# Patient Record
Sex: FEMALE | Race: WHITE | NOT HISPANIC OR LATINO | Employment: FULL TIME | URBAN - METROPOLITAN AREA
[De-identification: names, ages, dates, MRNs, and addresses within clinical notes are randomized per-mention and may not be internally consistent; named-entity substitution may affect disease eponyms.]

---

## 2019-02-21 ENCOUNTER — APPOINTMENT (OUTPATIENT)
Dept: WOMENS IMAGING | Facility: HOSPITAL | Age: 39
End: 2019-02-21

## 2019-02-21 PROCEDURE — 77063 BREAST TOMOSYNTHESIS BI: CPT | Performed by: RADIOLOGY

## 2019-02-21 PROCEDURE — 77067 SCR MAMMO BI INCL CAD: CPT | Performed by: RADIOLOGY

## 2019-05-21 ENCOUNTER — APPOINTMENT (OUTPATIENT)
Dept: WOMENS IMAGING | Facility: HOSPITAL | Age: 39
End: 2019-05-21

## 2019-05-21 PROCEDURE — 76641 ULTRASOUND BREAST COMPLETE: CPT | Performed by: RADIOLOGY

## 2020-02-25 ENCOUNTER — OFFICE VISIT (OUTPATIENT)
Dept: FAMILY MEDICINE CLINIC | Facility: CLINIC | Age: 40
End: 2020-02-25

## 2020-02-25 ENCOUNTER — APPOINTMENT (OUTPATIENT)
Dept: WOMENS IMAGING | Facility: HOSPITAL | Age: 40
End: 2020-02-25

## 2020-02-25 VITALS
BODY MASS INDEX: 30.73 KG/M2 | WEIGHT: 180 LBS | HEART RATE: 96 BPM | SYSTOLIC BLOOD PRESSURE: 118 MMHG | OXYGEN SATURATION: 95 % | TEMPERATURE: 98.4 F | HEIGHT: 64 IN | DIASTOLIC BLOOD PRESSURE: 80 MMHG

## 2020-02-25 DIAGNOSIS — Z23 NEED FOR TDAP VACCINATION: ICD-10-CM

## 2020-02-25 DIAGNOSIS — Z23 NEED FOR INFLUENZA VACCINATION: ICD-10-CM

## 2020-02-25 DIAGNOSIS — E03.9 HYPOTHYROIDISM, UNSPECIFIED TYPE: ICD-10-CM

## 2020-02-25 DIAGNOSIS — Z00.00 ROUTINE HEALTH MAINTENANCE: Primary | ICD-10-CM

## 2020-02-25 PROCEDURE — 77063 BREAST TOMOSYNTHESIS BI: CPT | Performed by: RADIOLOGY

## 2020-02-25 PROCEDURE — 90686 IIV4 VACC NO PRSV 0.5 ML IM: CPT | Performed by: FAMILY MEDICINE

## 2020-02-25 PROCEDURE — 99386 PREV VISIT NEW AGE 40-64: CPT | Performed by: FAMILY MEDICINE

## 2020-02-25 PROCEDURE — 90715 TDAP VACCINE 7 YRS/> IM: CPT | Performed by: FAMILY MEDICINE

## 2020-02-25 PROCEDURE — 77067 SCR MAMMO BI INCL CAD: CPT | Performed by: RADIOLOGY

## 2020-02-25 PROCEDURE — 90472 IMMUNIZATION ADMIN EACH ADD: CPT | Performed by: FAMILY MEDICINE

## 2020-02-25 PROCEDURE — 90471 IMMUNIZATION ADMIN: CPT | Performed by: FAMILY MEDICINE

## 2020-02-25 RX ORDER — OMEPRAZOLE 40 MG/1
40 CAPSULE, DELAYED RELEASE ORAL DAILY
COMMUNITY

## 2020-02-25 RX ORDER — NORETHINDRONE ACETATE AND ETHINYL ESTRADIOL 1; .02 MG/1; MG/1
1 TABLET ORAL DAILY
COMMUNITY
End: 2021-02-24

## 2020-02-25 RX ORDER — LEVOTHYROXINE SODIUM 0.12 MG/1
125 TABLET ORAL DAILY
COMMUNITY
End: 2020-02-26 | Stop reason: DRUGHIGH

## 2020-02-25 RX ORDER — FAMCICLOVIR 250 MG/1
TABLET ORAL
COMMUNITY
Start: 2017-01-31
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
## 2020-02-26 LAB
ALBUMIN SERPL-MCNC: 3.9 G/DL (ref 3.5–5.2)
ALBUMIN/GLOB SERPL: 1.2 G/DL
ALP SERPL-CCNC: 72 U/L (ref 39–117)
ALT SERPL-CCNC: 15 U/L (ref 1–33)
AST SERPL-CCNC: 15 U/L (ref 1–32)
BASOPHILS # BLD AUTO: 0.02 10*3/MM3 (ref 0–0.2)
BASOPHILS NFR BLD AUTO: 0.6 % (ref 0–1.5)
BILIRUB SERPL-MCNC: 0.4 MG/DL (ref 0.2–1.2)
BUN SERPL-MCNC: 15 MG/DL (ref 6–20)
BUN/CREAT SERPL: 20.3 (ref 7–25)
CALCIUM SERPL-MCNC: 9 MG/DL (ref 8.6–10.5)
CHLORIDE SERPL-SCNC: 100 MMOL/L (ref 98–107)
CHOLEST SERPL-MCNC: 148 MG/DL (ref 0–200)
CO2 SERPL-SCNC: 23.1 MMOL/L (ref 22–29)
CREAT SERPL-MCNC: 0.74 MG/DL (ref 0.57–1)
EOSINOPHIL # BLD AUTO: 0.11 10*3/MM3 (ref 0–0.4)
EOSINOPHIL NFR BLD AUTO: 3.1 % (ref 0.3–6.2)
ERYTHROCYTE [DISTWIDTH] IN BLOOD BY AUTOMATED COUNT: 12.6 % (ref 12.3–15.4)
GLOBULIN SER CALC-MCNC: 3.3 GM/DL
GLUCOSE SERPL-MCNC: 80 MG/DL (ref 65–99)
HCT VFR BLD AUTO: 40.1 % (ref 34–46.6)
HDLC SERPL-MCNC: 45 MG/DL (ref 40–60)
HGB BLD-MCNC: 14 G/DL (ref 12–15.9)
IMM GRANULOCYTES # BLD AUTO: 0.01 10*3/MM3 (ref 0–0.05)
IMM GRANULOCYTES NFR BLD AUTO: 0.3 % (ref 0–0.5)
LDLC SERPL CALC-MCNC: 76 MG/DL (ref 0–100)
LYMPHOCYTES # BLD AUTO: 1.07 10*3/MM3 (ref 0.7–3.1)
LYMPHOCYTES NFR BLD AUTO: 30.5 % (ref 19.6–45.3)
MCH RBC QN AUTO: 33.2 PG (ref 26.6–33)
MCHC RBC AUTO-ENTMCNC: 34.9 G/DL (ref 31.5–35.7)
MCV RBC AUTO: 95 FL (ref 79–97)
MONOCYTES # BLD AUTO: 0.3 10*3/MM3 (ref 0.1–0.9)
MONOCYTES NFR BLD AUTO: 8.5 % (ref 5–12)
NEUTROPHILS # BLD AUTO: 2 10*3/MM3 (ref 1.7–7)
NEUTROPHILS NFR BLD AUTO: 57 % (ref 42.7–76)
NRBC BLD AUTO-RTO: 0 /100 WBC (ref 0–0.2)
PLATELET # BLD AUTO: 276 10*3/MM3 (ref 140–450)
POTASSIUM SERPL-SCNC: 4.5 MMOL/L (ref 3.5–5.2)
PROT SERPL-MCNC: 7.2 G/DL (ref 6–8.5)
RBC # BLD AUTO: 4.22 10*6/MM3 (ref 3.77–5.28)
SODIUM SERPL-SCNC: 137 MMOL/L (ref 136–145)
T3FREE SERPL-MCNC: 5.5 PG/ML (ref 2–4.4)
T4 FREE SERPL-MCNC: 2.24 NG/DL (ref 0.93–1.7)
THYROPEROXIDASE AB SERPL-ACNC: 79 IU/ML (ref 0–34)
TRIGL SERPL-MCNC: 137 MG/DL (ref 0–150)
TSH SERPL DL<=0.005 MIU/L-ACNC: <0.005 UIU/ML (ref 0.27–4.2)
VLDLC SERPL CALC-MCNC: 27.4 MG/DL
WBC # BLD AUTO: 3.51 10*3/MM3 (ref 3.4–10.8)

## 2020-02-26 RX ORDER — LEVOTHYROXINE SODIUM 0.1 MG/1
100 TABLET ORAL DAILY
Qty: 90 TABLET | Refills: 1 | Status: SHIPPED | OUTPATIENT
Start: 2020-02-26 | End: 2020-08-24

## 2020-03-05 ENCOUNTER — HOSPITAL ENCOUNTER (OUTPATIENT)
Dept: ULTRASOUND IMAGING | Facility: HOSPITAL | Age: 40
Discharge: HOME | End: 2020-03-05
Admitting: FAMILY MEDICINE

## 2020-03-05 DIAGNOSIS — E03.9 HYPOTHYROIDISM, UNSPECIFIED TYPE: ICD-10-CM

## 2020-03-05 PROCEDURE — 76536 US EXAM OF HEAD AND NECK: CPT

## 2020-08-24 RX ORDER — LEVOTHYROXINE SODIUM 0.1 MG/1
100 TABLET ORAL DAILY
Qty: 90 TABLET | Refills: 0 | Status: SHIPPED | OUTPATIENT
Start: 2020-08-24 | End: 2020-08-26

## 2020-08-25 ENCOUNTER — OFFICE VISIT (OUTPATIENT)
Dept: FAMILY MEDICINE CLINIC | Facility: CLINIC | Age: 40
End: 2020-08-25

## 2020-08-25 VITALS
SYSTOLIC BLOOD PRESSURE: 112 MMHG | TEMPERATURE: 97.3 F | DIASTOLIC BLOOD PRESSURE: 66 MMHG | HEART RATE: 100 BPM | HEIGHT: 64 IN | BODY MASS INDEX: 30.11 KG/M2 | OXYGEN SATURATION: 98 % | WEIGHT: 176.4 LBS

## 2020-08-25 DIAGNOSIS — E03.8 HYPOTHYROIDISM DUE TO HASHIMOTO'S THYROIDITIS: Primary | ICD-10-CM

## 2020-08-25 DIAGNOSIS — D49.2 NEOPLASM OF SKIN OF CHEST: ICD-10-CM

## 2020-08-25 DIAGNOSIS — E06.3 HYPOTHYROIDISM DUE TO HASHIMOTO'S THYROIDITIS: Primary | ICD-10-CM

## 2020-08-25 DIAGNOSIS — E04.1 THYROID NODULE: ICD-10-CM

## 2020-08-25 PROCEDURE — 99213 OFFICE O/P EST LOW 20 MIN: CPT | Performed by: FAMILY MEDICINE

## 2020-08-25 NOTE — PROGRESS NOTES
Subjective   Saskia Fox is a 40 y.o. female.     Chief Complaint   Patient presents with   • Hypothyroidism        Patient presents for her 6-month follow-up thyroid.  After I last saw her we adjusted her medication for low TSH and found that she was in fact Hashimoto's.  She had a thyroid ultrasound that confirmed 2 small thyroid nodules and will be due for repeat in 1 year.  She has questions about weight and we discussed diet at length.  She is also wondering about a spot that came up on her right chest over the past few weeks.         The following portions of the patient's history were reviewed and updated as appropriate: allergies, current medications, past family history, past medical history, past social history, past surgical history and problem list.    Past Medical History:   Diagnosis Date   • Abnormal Papanicolaou smear of cervix 2003    CIN1   • Cervicitis 2002   • Cyst of ovary    • Dysmenorrhea    • Endometriosis 2000   • Headache    • HPV in female 2009   • Hypothyroidism    • IBS (irritable bowel syndrome) 10/2014   • Migraine headache    • PONV (postoperative nausea and vomiting)    • Rash     RIGHT ARM-CUBITAL AREA   • Vaginal venereal warts 2009       Past Surgical History:   Procedure Laterality Date   • CHOLECYSTECTOMY     • D&C HYSTEROSCOPY N/A 12/8/2016    Procedure: DILATATION AND CURETTAGE HYSTEROSCOPY WITH MYOSURE lite REMOVAL OF POLYP;  Surgeon: Char Ornelas MD;  Location: [facility];  Service:    • EXPLORATORY LAPAROTOMY  2000    ENDOMETRIOSIS   • VULVA BIOPSY      rt vulva bx   • VULVA SURGERY      DISTRUCTION OF CONDYLOMA   • WISDOM TOOTH EXTRACTION         Family History   Problem Relation Age of Onset   • Diabetes Mother    • Hyperlipidemia Mother    • Heart disease Maternal Grandmother    • Cancer Maternal Grandmother    • Stroke Maternal Grandfather    • Prostate cancer Maternal Grandfather    • Osteoporosis Paternal Grandmother        Social History      Socioeconomic History   • Marital status: Single     Spouse name: Not on file   • Number of children: Not on file   • Years of education: Not on file   • Highest education level: Not on file   Tobacco Use   • Smoking status: Never Smoker   • Smokeless tobacco: Never Used   Substance and Sexual Activity   • Alcohol use: Yes     Comment: RARELY   • Drug use: No   • Sexual activity: Yes     Partners: Male     Birth control/protection: OCP         Current Outpatient Medications:   •  Cholecalciferol (VITAMIN D-1000 MAX ST) 25 MCG (1000 UT) tablet, Take 2,000 Units by mouth Daily., Disp: , Rfl:   •  famciclovir (FAMVIR) 250 MG tablet, , Disp: , Rfl:   •  fluticasone (CUTIVATE) 0.05 % cream, Apply 1 application topically 2 (Two) Times a Day. RASH ON ARMS, Disp: , Rfl:   •  levothyroxine (SYNTHROID, LEVOTHROID) 100 MCG tablet, TAKE 1 TABLET BY MOUTH DAILY, Disp: 90 tablet, Rfl: 0  •  norethindrone-ethinyl estradiol (MICROGESTIN 1/20) 1-20 MG-MCG per tablet, Take 1 tablet by mouth Daily., Disp: , Rfl:   •  omeprazole (priLOSEC) 40 MG capsule, Take 40 mg by mouth Daily., Disp: , Rfl:   •  acetaminophen (TYLENOL) 500 MG tablet, Take 500 mg by mouth As Needed for mild pain (1-3)., Disp: , Rfl:     Review of Systems   Constitutional: Negative for chills, fatigue and fever.   HENT: Negative for congestion, rhinorrhea and sore throat.    Respiratory: Negative for cough and shortness of breath.    Cardiovascular: Negative for chest pain and leg swelling.   Gastrointestinal: Negative for abdominal pain.   Endocrine: Negative for polydipsia and polyuria.   Genitourinary: Negative for dysuria.   Musculoskeletal: Negative for arthralgias and myalgias.   Skin: Positive for color change. Negative for rash.   Neurological: Negative for dizziness.   Hematological: Does not bruise/bleed easily.   Psychiatric/Behavioral: Negative for sleep disturbance.       Objective   Vitals:    08/25/20 0817   BP: 112/66   Pulse: 100   Temp: 97.3 °F  "(36.3 °C)   SpO2: 98%   Weight: 80 kg (176 lb 6.4 oz)   Height: 162.6 cm (64\")     Body mass index is 30.28 kg/m².  Physical Exam   Constitutional: She is oriented to person, place, and time. She appears well-developed and well-nourished.   HENT:   Head: Normocephalic and atraumatic.   Eyes: Pupils are equal, round, and reactive to light. Conjunctivae and EOM are normal.   Neck: Neck supple. No thyromegaly present.   Cardiovascular: Normal rate and regular rhythm.   No murmur heard.  Pulmonary/Chest: Effort normal and breath sounds normal. She has no wheezes.   Abdominal: Soft.   Musculoskeletal: Normal range of motion.   Neurological: She is alert and oriented to person, place, and time. No cranial nerve deficit.   Skin: Skin is warm and dry.   Right chest just above breast with 4 mm pearly red papule with raised borders.   Psychiatric: She has a normal mood and affect.         Assessment/Plan   Saskia was seen today for hypothyroidism.    Diagnoses and all orders for this visit:    Hypothyroidism due to Hashimoto's thyroiditis  -     TSH    BMI 30.0-30.9,adult    Thyroid nodule    Neoplasm of skin of chest               Patient Instructions   Look into intermittent fasting.  Aim for 1200 calories daily.  Daily exercise.  Look into Mediterranean diet.        Calorie Counting for Weight Loss  Calories are units of energy. Your body needs a certain amount of calories from food to keep you going throughout the day. When you eat more calories than your body needs, your body stores the extra calories as fat. When you eat fewer calories than your body needs, your body burns fat to get the energy it needs.  Calorie counting means keeping track of how many calories you eat and drink each day. Calorie counting can be helpful if you need to lose weight. If you make sure to eat fewer calories than your body needs, you should lose weight. Ask your health care provider what a healthy weight is for you.  For calorie counting to " work, you will need to eat the right number of calories in a day in order to lose a healthy amount of weight per week. A dietitian can help you determine how many calories you need in a day and will give you suggestions on how to reach your calorie goal.  · A healthy amount of weight to lose per week is usually 1-2 lb (0.5-0.9 kg). This usually means that your daily calorie intake should be reduced by 500-750 calories.  · Eating 1,200 - 1,500 calories per day can help most women lose weight.  · Eating 1,500 - 1,800 calories per day can help most men lose weight.  What is my plan?  My goal is to have __________ calories per day.  If I have this many calories per day, I should lose around __________ pounds per week.  What do I need to know about calorie counting?  In order to meet your daily calorie goal, you will need to:  · Find out how many calories are in each food you would like to eat. Try to do this before you eat.  · Decide how much of the food you plan to eat.  · Write down what you ate and how many calories it had. Doing this is called keeping a food log.  To successfully lose weight, it is important to balance calorie counting with a healthy lifestyle that includes regular activity. Aim for 150 minutes of moderate exercise (such as walking) or 75 minutes of vigorous exercise (such as running) each week.  Where do I find calorie information?    The number of calories in a food can be found on a Nutrition Facts label. If a food does not have a Nutrition Facts label, try to look up the calories online or ask your dietitian for help.  Remember that calories are listed per serving. If you choose to have more than one serving of a food, you will have to multiply the calories per serving by the amount of servings you plan to eat. For example, the label on a package of bread might say that a serving size is 1 slice and that there are 90 calories in a serving. If you eat 1 slice, you will have eaten 90 calories. If  "you eat 2 slices, you will have eaten 180 calories.  How do I keep a food log?  Immediately after each meal, record the following information in your food log:  · What you ate. Don't forget to include toppings, sauces, and other extras on the food.  · How much you ate. This can be measured in cups, ounces, or number of items.  · How many calories each food and drink had.  · The total number of calories in the meal.  Keep your food log near you, such as in a small notebook in your pocket, or use a mobile david or website. Some programs will calculate calories for you and show you how many calories you have left for the day to meet your goal.  What are some calorie counting tips?    · Use your calories on foods and drinks that will fill you up and not leave you hungry:  ? Some examples of foods that fill you up are nuts and nut butters, vegetables, lean proteins, and high-fiber foods like whole grains. High-fiber foods are foods with more than 5 g fiber per serving.  ? Drinks such as sodas, specialty coffee drinks, alcohol, and juices have a lot of calories, yet do not fill you up.  · Eat nutritious foods and avoid empty calories. Empty calories are calories you get from foods or beverages that do not have many vitamins or protein, such as candy, sweets, and soda. It is better to have a nutritious high-calorie food (such as an avocado) than a food with few nutrients (such as a bag of chips).  · Know how many calories are in the foods you eat most often. This will help you calculate calorie counts faster.  · Pay attention to calories in drinks. Low-calorie drinks include water and unsweetened drinks.  · Pay attention to nutrition labels for \"low fat\" or \"fat free\" foods. These foods sometimes have the same amount of calories or more calories than the full fat versions. They also often have added sugar, starch, or salt, to make up for flavor that was removed with the fat.  · Find a way of tracking calories that works for " you. Get creative. Try different apps or programs if writing down calories does not work for you.  What are some portion control tips?  · Know how many calories are in a serving. This will help you know how many servings of a certain food you can have.  · Use a measuring cup to measure serving sizes. You could also try weighing out portions on a kitchen scale. With time, you will be able to estimate serving sizes for some foods.  · Take some time to put servings of different foods on your favorite plates, bowls, and cups so you know what a serving looks like.  · Try not to eat straight from a bag or box. Doing this can lead to overeating. Put the amount you would like to eat in a cup or on a plate to make sure you are eating the right portion.  · Use smaller plates, glasses, and bowls to prevent overeating.  · Try not to multitask (for example, watch TV or use your computer) while eating. If it is time to eat, sit down at a table and enjoy your food. This will help you to know when you are full. It will also help you to be aware of what you are eating and how much you are eating.  What are tips for following this plan?  Reading food labels  · Check the calorie count compared to the serving size. The serving size may be smaller than what you are used to eating.  · Check the source of the calories. Make sure the food you are eating is high in vitamins and protein and low in saturated and trans fats.  Shopping  · Read nutrition labels while you shop. This will help you make healthy decisions before you decide to purchase your food.  · Make a grocery list and stick to it.  Cooking  · Try to cook your favorite foods in a healthier way. For example, try baking instead of frying.  · Use low-fat dairy products.  Meal planning  · Use more fruits and vegetables. Half of your plate should be fruits and vegetables.  · Include lean proteins like poultry and fish.  How do I count calories when eating out?  · Ask for smaller  "portion sizes.  · Consider sharing an entree and sides instead of getting your own entree.  · If you get your own entree, eat only half. Ask for a box at the beginning of your meal and put the rest of your entree in it so you are not tempted to eat it.  · If calories are listed on the menu, choose the lower calorie options.  · Choose dishes that include vegetables, fruits, whole grains, low-fat dairy products, and lean protein.  · Choose items that are boiled, broiled, grilled, or steamed. Stay away from items that are buttered, battered, fried, or served with cream sauce. Items labeled \"crispy\" are usually fried, unless stated otherwise.  · Choose water, low-fat milk, unsweetened iced tea, or other drinks without added sugar. If you want an alcoholic beverage, choose a lower calorie option such as a glass of wine or light beer.  · Ask for dressings, sauces, and syrups on the side. These are usually high in calories, so you should limit the amount you eat.  · If you want a salad, choose a garden salad and ask for grilled meats. Avoid extra toppings like light, cheese, or fried items. Ask for the dressing on the side, or ask for olive oil and vinegar or lemon to use as dressing.  · Estimate how many servings of a food you are given. For example, a serving of cooked rice is ½ cup or about the size of half a baseball. Knowing serving sizes will help you be aware of how much food you are eating at restaurants. The list below tells you how big or small some common portion sizes are based on everyday objects:  ? 1 oz--4 stacked dice.  ? 3 oz--1 deck of cards.  ? 1 tsp--1 die.  ? 1 Tbsp--½ a ping-pong ball.  ? 2 Tbsp--1 ping-pong ball.  ? ½ cup--½ baseball.  ? 1 cup--1 baseball.  Summary  · Calorie counting means keeping track of how many calories you eat and drink each day. If you eat fewer calories than your body needs, you should lose weight.  · A healthy amount of weight to lose per week is usually 1-2 lb (0.5-0.9 kg). " This usually means reducing your daily calorie intake by 500-750 calories.  · The number of calories in a food can be found on a Nutrition Facts label. If a food does not have a Nutrition Facts label, try to look up the calories online or ask your dietitian for help.  · Use your calories on foods and drinks that will fill you up, and not on foods and drinks that will leave you hungry.  · Use smaller plates, glasses, and bowls to prevent overeating.  This information is not intended to replace advice given to you by your health care provider. Make sure you discuss any questions you have with your health care provider.  Document Released: 12/18/2006 Document Revised: 09/06/2019 Document Reviewed: 11/17/2017  [employer]vier Patient Education © 2020 [employer]

## 2020-08-25 NOTE — PATIENT INSTRUCTIONS
Look into intermittent fasting.  Aim for 1200 calories daily.  Daily exercise.  Look into Mediterranean diet.        Calorie Counting for Weight Loss  Calories are units of energy. Your body needs a certain amount of calories from food to keep you going throughout the day. When you eat more calories than your body needs, your body stores the extra calories as fat. When you eat fewer calories than your body needs, your body burns fat to get the energy it needs.  Calorie counting means keeping track of how many calories you eat and drink each day. Calorie counting can be helpful if you need to lose weight. If you make sure to eat fewer calories than your body needs, you should lose weight. Ask your health care provider what a healthy weight is for you.  For calorie counting to work, you will need to eat the right number of calories in a day in order to lose a healthy amount of weight per week. A dietitian can help you determine how many calories you need in a day and will give you suggestions on how to reach your calorie goal.  · A healthy amount of weight to lose per week is usually 1-2 lb (0.5-0.9 kg). This usually means that your daily calorie intake should be reduced by 500-750 calories.  · Eating 1,200 - 1,500 calories per day can help most women lose weight.  · Eating 1,500 - 1,800 calories per day can help most men lose weight.  What is my plan?  My goal is to have __________ calories per day.  If I have this many calories per day, I should lose around __________ pounds per week.  What do I need to know about calorie counting?  In order to meet your daily calorie goal, you will need to:  · Find out how many calories are in each food you would like to eat. Try to do this before you eat.  · Decide how much of the food you plan to eat.  · Write down what you ate and how many calories it had. Doing this is called keeping a food log.  To successfully lose weight, it is important to balance calorie counting with a  healthy lifestyle that includes regular activity. Aim for 150 minutes of moderate exercise (such as walking) or 75 minutes of vigorous exercise (such as running) each week.  Where do I find calorie information?    The number of calories in a food can be found on a Nutrition Facts label. If a food does not have a Nutrition Facts label, try to look up the calories online or ask your dietitian for help.  Remember that calories are listed per serving. If you choose to have more than one serving of a food, you will have to multiply the calories per serving by the amount of servings you plan to eat. For example, the label on a package of bread might say that a serving size is 1 slice and that there are 90 calories in a serving. If you eat 1 slice, you will have eaten 90 calories. If you eat 2 slices, you will have eaten 180 calories.  How do I keep a food log?  Immediately after each meal, record the following information in your food log:  · What you ate. Don't forget to include toppings, sauces, and other extras on the food.  · How much you ate. This can be measured in cups, ounces, or number of items.  · How many calories each food and drink had.  · The total number of calories in the meal.  Keep your food log near you, such as in a small notebook in your pocket, or use a mobile david or website. Some programs will calculate calories for you and show you how many calories you have left for the day to meet your goal.  What are some calorie counting tips?    · Use your calories on foods and drinks that will fill you up and not leave you hungry:  ? Some examples of foods that fill you up are nuts and nut butters, vegetables, lean proteins, and high-fiber foods like whole grains. High-fiber foods are foods with more than 5 g fiber per serving.  ? Drinks such as sodas, specialty coffee drinks, alcohol, and juices have a lot of calories, yet do not fill you up.  · Eat nutritious foods and avoid empty calories. Empty calories  "are calories you get from foods or beverages that do not have many vitamins or protein, such as candy, sweets, and soda. It is better to have a nutritious high-calorie food (such as an avocado) than a food with few nutrients (such as a bag of chips).  · Know how many calories are in the foods you eat most often. This will help you calculate calorie counts faster.  · Pay attention to calories in drinks. Low-calorie drinks include water and unsweetened drinks.  · Pay attention to nutrition labels for \"low fat\" or \"fat free\" foods. These foods sometimes have the same amount of calories or more calories than the full fat versions. They also often have added sugar, starch, or salt, to make up for flavor that was removed with the fat.  · Find a way of tracking calories that works for you. Get creative. Try different apps or programs if writing down calories does not work for you.  What are some portion control tips?  · Know how many calories are in a serving. This will help you know how many servings of a certain food you can have.  · Use a measuring cup to measure serving sizes. You could also try weighing out portions on a kitchen scale. With time, you will be able to estimate serving sizes for some foods.  · Take some time to put servings of different foods on your favorite plates, bowls, and cups so you know what a serving looks like.  · Try not to eat straight from a bag or box. Doing this can lead to overeating. Put the amount you would like to eat in a cup or on a plate to make sure you are eating the right portion.  · Use smaller plates, glasses, and bowls to prevent overeating.  · Try not to multitask (for example, watch TV or use your computer) while eating. If it is time to eat, sit down at a table and enjoy your food. This will help you to know when you are full. It will also help you to be aware of what you are eating and how much you are eating.  What are tips for following this plan?  Reading food " "labels  · Check the calorie count compared to the serving size. The serving size may be smaller than what you are used to eating.  · Check the source of the calories. Make sure the food you are eating is high in vitamins and protein and low in saturated and trans fats.  Shopping  · Read nutrition labels while you shop. This will help you make healthy decisions before you decide to purchase your food.  · Make a grocery list and stick to it.  Cooking  · Try to cook your favorite foods in a healthier way. For example, try baking instead of frying.  · Use low-fat dairy products.  Meal planning  · Use more fruits and vegetables. Half of your plate should be fruits and vegetables.  · Include lean proteins like poultry and fish.  How do I count calories when eating out?  · Ask for smaller portion sizes.  · Consider sharing an entree and sides instead of getting your own entree.  · If you get your own entree, eat only half. Ask for a box at the beginning of your meal and put the rest of your entree in it so you are not tempted to eat it.  · If calories are listed on the menu, choose the lower calorie options.  · Choose dishes that include vegetables, fruits, whole grains, low-fat dairy products, and lean protein.  · Choose items that are boiled, broiled, grilled, or steamed. Stay away from items that are buttered, battered, fried, or served with cream sauce. Items labeled \"crispy\" are usually fried, unless stated otherwise.  · Choose water, low-fat milk, unsweetened iced tea, or other drinks without added sugar. If you want an alcoholic beverage, choose a lower calorie option such as a glass of wine or light beer.  · Ask for dressings, sauces, and syrups on the side. These are usually high in calories, so you should limit the amount you eat.  · If you want a salad, choose a garden salad and ask for grilled meats. Avoid extra toppings like light, cheese, or fried items. Ask for the dressing on the side, or ask for olive oil " and vinegar or lemon to use as dressing.  · Estimate how many servings of a food you are given. For example, a serving of cooked rice is ½ cup or about the size of half a baseball. Knowing serving sizes will help you be aware of how much food you are eating at restaurants. The list below tells you how big or small some common portion sizes are based on everyday objects:  ? 1 oz--4 stacked dice.  ? 3 oz--1 deck of cards.  ? 1 tsp--1 die.  ? 1 Tbsp--½ a ping-pong ball.  ? 2 Tbsp--1 ping-pong ball.  ? ½ cup--½ baseball.  ? 1 cup--1 baseball.  Summary  · Calorie counting means keeping track of how many calories you eat and drink each day. If you eat fewer calories than your body needs, you should lose weight.  · A healthy amount of weight to lose per week is usually 1-2 lb (0.5-0.9 kg). This usually means reducing your daily calorie intake by 500-750 calories.  · The number of calories in a food can be found on a Nutrition Facts label. If a food does not have a Nutrition Facts label, try to look up the calories online or ask your dietitian for help.  · Use your calories on foods and drinks that will fill you up, and not on foods and drinks that will leave you hungry.  · Use smaller plates, glasses, and bowls to prevent overeating.  This information is not intended to replace advice given to you by your health care provider. Make sure you discuss any questions you have with your health care provider.  Document Released: 12/18/2006 Document Revised: 09/06/2019 Document Reviewed: 11/17/2017  Elsevier Patient Education © 2020 [employer]

## 2020-08-26 DIAGNOSIS — E03.8 HYPOTHYROIDISM DUE TO HASHIMOTO'S THYROIDITIS: Primary | ICD-10-CM

## 2020-08-26 DIAGNOSIS — E06.3 HYPOTHYROIDISM DUE TO HASHIMOTO'S THYROIDITIS: Primary | ICD-10-CM

## 2020-08-26 LAB — TSH SERPL DL<=0.005 MIU/L-ACNC: <0.005 UIU/ML (ref 0.27–4.2)

## 2020-08-26 RX ORDER — LEVOTHYROXINE SODIUM 88 UG/1
88 TABLET ORAL DAILY
Qty: 90 TABLET | Refills: 1 | Status: SHIPPED | OUTPATIENT
Start: 2020-08-26 | End: 2020-12-07 | Stop reason: DRUGHIGH

## 2020-11-02 ENCOUNTER — TELEPHONE (OUTPATIENT)
Dept: FAMILY MEDICINE CLINIC | Facility: CLINIC | Age: 40
End: 2020-11-02

## 2020-11-02 DIAGNOSIS — E06.3 HYPOTHYROIDISM DUE TO HASHIMOTO'S THYROIDITIS: Primary | ICD-10-CM

## 2020-11-02 DIAGNOSIS — E03.8 HYPOTHYROIDISM DUE TO HASHIMOTO'S THYROIDITIS: Primary | ICD-10-CM

## 2020-11-02 NOTE — TELEPHONE ENCOUNTER
PATIENT WAS TOLD AT HER LAST APPOINTMENT, BY DR. KEHRER, TO CALL BACK IN 3 MONTHS TO SCHEDULE ANOTHER THYROID SCAN.    PATIENT WASN'T SURE WHAT OFFICE TO CALL TO GET THIS SET UP.    PLEASE ADVISE:  [id number]

## 2020-11-25 ENCOUNTER — LAB (OUTPATIENT)
Dept: FAMILY MEDICINE CLINIC | Facility: CLINIC | Age: 40
End: 2020-11-25

## 2020-11-25 ENCOUNTER — RESULTS ENCOUNTER (OUTPATIENT)
Dept: FAMILY MEDICINE CLINIC | Facility: CLINIC | Age: 40
End: 2020-11-25

## 2020-11-25 VITALS — TEMPERATURE: 96.6 F

## 2020-11-25 DIAGNOSIS — E06.3 HYPOTHYROIDISM DUE TO HASHIMOTO'S THYROIDITIS: ICD-10-CM

## 2020-11-25 DIAGNOSIS — E03.8 HYPOTHYROIDISM DUE TO HASHIMOTO'S THYROIDITIS: ICD-10-CM

## 2020-11-25 LAB — TSH SERPL DL<=0.005 MIU/L-ACNC: <0.005 UIU/ML (ref 0.27–4.2)

## 2020-12-07 DIAGNOSIS — E03.8 HYPOTHYROIDISM DUE TO HASHIMOTO'S THYROIDITIS: Primary | ICD-10-CM

## 2020-12-07 DIAGNOSIS — E06.3 HYPOTHYROIDISM DUE TO HASHIMOTO'S THYROIDITIS: Primary | ICD-10-CM

## 2020-12-07 RX ORDER — LEVOTHYROXINE SODIUM 0.07 MG/1
75 TABLET ORAL DAILY
Qty: 90 TABLET | Refills: 1 | Status: SHIPPED | OUTPATIENT
Start: 2020-12-07 | End: 2021-07-08 | Stop reason: DRUGHIGH

## 2021-02-22 DIAGNOSIS — E03.8 HYPOTHYROIDISM DUE TO HASHIMOTO'S THYROIDITIS: ICD-10-CM

## 2021-02-22 DIAGNOSIS — E06.3 HYPOTHYROIDISM DUE TO HASHIMOTO'S THYROIDITIS: ICD-10-CM

## 2021-02-22 RX ORDER — LEVOTHYROXINE SODIUM 88 UG/1
88 TABLET ORAL DAILY
Qty: 90 TABLET | Refills: 1 | OUTPATIENT
Start: 2021-02-22

## 2021-02-24 ENCOUNTER — OFFICE VISIT (OUTPATIENT)
Dept: FAMILY MEDICINE CLINIC | Facility: CLINIC | Age: 41
End: 2021-02-24

## 2021-02-24 VITALS
WEIGHT: 186.8 LBS | TEMPERATURE: 97.7 F | HEART RATE: 106 BPM | BODY MASS INDEX: 31.12 KG/M2 | HEIGHT: 65 IN | OXYGEN SATURATION: 98 %

## 2021-02-24 DIAGNOSIS — Z00.00 ROUTINE HEALTH MAINTENANCE: Primary | ICD-10-CM

## 2021-02-24 DIAGNOSIS — E04.1 THYROID NODULE: Chronic | ICD-10-CM

## 2021-02-24 DIAGNOSIS — E06.3 HYPOTHYROIDISM DUE TO HASHIMOTO'S THYROIDITIS: Chronic | ICD-10-CM

## 2021-02-24 DIAGNOSIS — K58.0 IRRITABLE BOWEL SYNDROME WITH DIARRHEA: ICD-10-CM

## 2021-02-24 DIAGNOSIS — E03.8 HYPOTHYROIDISM DUE TO HASHIMOTO'S THYROIDITIS: Chronic | ICD-10-CM

## 2021-02-24 PROCEDURE — 99396 PREV VISIT EST AGE 40-64: CPT | Performed by: FAMILY MEDICINE
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
## 2021-02-25 LAB
ALBUMIN SERPL-MCNC: 4.3 G/DL (ref 3.5–5.2)
ALBUMIN/GLOB SERPL: 1.5 G/DL
ALP SERPL-CCNC: 98 U/L (ref 39–117)
ALT SERPL-CCNC: 19 U/L (ref 1–33)
AST SERPL-CCNC: 21 U/L (ref 1–32)
BASOPHILS # BLD AUTO: 0.02 10*3/MM3 (ref 0–0.2)
BASOPHILS NFR BLD AUTO: 0.5 % (ref 0–1.5)
BILIRUB SERPL-MCNC: 0.4 MG/DL (ref 0–1.2)
BUN SERPL-MCNC: 17 MG/DL (ref 6–20)
BUN/CREAT SERPL: 23.6 (ref 7–25)
CALCIUM SERPL-MCNC: 9.4 MG/DL (ref 8.6–10.5)
CHLORIDE SERPL-SCNC: 102 MMOL/L (ref 98–107)
CHOLEST SERPL-MCNC: 182 MG/DL (ref 0–200)
CO2 SERPL-SCNC: 26.2 MMOL/L (ref 22–29)
CREAT SERPL-MCNC: 0.72 MG/DL (ref 0.57–1)
EOSINOPHIL # BLD AUTO: 0.18 10*3/MM3 (ref 0–0.4)
EOSINOPHIL NFR BLD AUTO: 4.1 % (ref 0.3–6.2)
ERYTHROCYTE [DISTWIDTH] IN BLOOD BY AUTOMATED COUNT: 13.1 % (ref 12.3–15.4)
GLOBULIN SER CALC-MCNC: 2.9 GM/DL
GLUCOSE SERPL-MCNC: 85 MG/DL (ref 65–99)
HCT VFR BLD AUTO: 43 % (ref 34–46.6)
HDLC SERPL-MCNC: 53 MG/DL (ref 40–60)
HGB BLD-MCNC: 14.5 G/DL (ref 12–15.9)
IMM GRANULOCYTES # BLD AUTO: 0.01 10*3/MM3 (ref 0–0.05)
IMM GRANULOCYTES NFR BLD AUTO: 0.2 % (ref 0–0.5)
LDLC SERPL CALC-MCNC: 110 MG/DL (ref 0–100)
LYMPHOCYTES # BLD AUTO: 1.06 10*3/MM3 (ref 0.7–3.1)
LYMPHOCYTES NFR BLD AUTO: 24.1 % (ref 19.6–45.3)
MCH RBC QN AUTO: 32.1 PG (ref 26.6–33)
MCHC RBC AUTO-ENTMCNC: 33.7 G/DL (ref 31.5–35.7)
MCV RBC AUTO: 95.1 FL (ref 79–97)
MONOCYTES # BLD AUTO: 0.34 10*3/MM3 (ref 0.1–0.9)
MONOCYTES NFR BLD AUTO: 7.7 % (ref 5–12)
NEUTROPHILS # BLD AUTO: 2.79 10*3/MM3 (ref 1.7–7)
NEUTROPHILS NFR BLD AUTO: 63.4 % (ref 42.7–76)
NRBC BLD AUTO-RTO: 0 /100 WBC (ref 0–0.2)
PLATELET # BLD AUTO: 344 10*3/MM3 (ref 140–450)
POTASSIUM SERPL-SCNC: 4.5 MMOL/L (ref 3.5–5.2)
PROT SERPL-MCNC: 7.2 G/DL (ref 6–8.5)
RBC # BLD AUTO: 4.52 10*6/MM3 (ref 3.77–5.28)
SODIUM SERPL-SCNC: 139 MMOL/L (ref 136–145)
T3FREE SERPL-MCNC: 3.4 PG/ML (ref 2–4.4)
T4 FREE SERPL-MCNC: 1.46 NG/DL (ref 0.93–1.7)
TRIGL SERPL-MCNC: 107 MG/DL (ref 0–150)
TSH SERPL DL<=0.005 MIU/L-ACNC: 0.01 UIU/ML (ref 0.27–4.2)
VLDLC SERPL CALC-MCNC: 19 MG/DL (ref 5–40)
WBC # BLD AUTO: 4.4 10*3/MM3 (ref 3.4–10.8)

## 2021-03-02 ENCOUNTER — OFFICE VISIT (OUTPATIENT)
Dept: OBSTETRICS AND GYNECOLOGY | Age: 41
End: 2021-03-02

## 2021-03-02 VITALS
DIASTOLIC BLOOD PRESSURE: 84 MMHG | SYSTOLIC BLOOD PRESSURE: 130 MMHG | HEIGHT: 65 IN | BODY MASS INDEX: 31.16 KG/M2 | WEIGHT: 187 LBS

## 2021-03-02 DIAGNOSIS — Z01.419 ENCOUNTER FOR GYNECOLOGICAL EXAMINATION WITHOUT ABNORMAL FINDING: Primary | ICD-10-CM

## 2021-03-02 DIAGNOSIS — Z87.42 HISTORY OF ENDOMETRIOSIS: ICD-10-CM

## 2021-03-02 DIAGNOSIS — N89.8 VAGINAL DISCHARGE: ICD-10-CM

## 2021-03-02 DIAGNOSIS — Z12.4 SCREENING FOR MALIGNANT NEOPLASM OF THE CERVIX: ICD-10-CM

## 2021-03-02 DIAGNOSIS — N94.6 DYSMENORRHEA: ICD-10-CM

## 2021-03-02 DIAGNOSIS — Z12.31 BREAST CANCER SCREENING BY MAMMOGRAM: ICD-10-CM

## 2021-03-02 DIAGNOSIS — N90.89 SKIN TAG OF VULVA: ICD-10-CM

## 2021-03-02 DIAGNOSIS — N93.9 ABNORMAL UTERINE BLEEDING (AUB): ICD-10-CM

## 2021-03-02 DIAGNOSIS — Z86.19 HISTORY OF CONDYLOMA ACUMINATUM: ICD-10-CM

## 2021-03-02 PROCEDURE — 56605 BIOPSY OF VULVA/PERINEUM: CPT | Performed by: OBSTETRICS & GYNECOLOGY

## 2021-03-02 PROCEDURE — 99386 PREV VISIT NEW AGE 40-64: CPT | Performed by: OBSTETRICS & GYNECOLOGY

## 2021-03-02 NOTE — PROGRESS NOTES
"Subjective   Saskia Fox is a 41 y.o. female reestablish care today / previous care with dr burgos -cc: annual visit today , last pap 2019 neg @ [facility] obrien , last mmg 219 @ [facility] , stopped taking bcp back in october trying to get pregnant , has a boyfriend but worries about the age . wants to discuss fertility with you.  Lengthy discussion regarding age and no time to waste and rec of fertility specialist.  Boyfriend has fathered two children.      History of Present Illness    The following portions of the patient's history were reviewed and updated as appropriate: allergies, current medications, past family history, past medical history, past social history, past surgical history and problem list.    Review of Systems   Constitutional: Negative for chills, fatigue and fever.   Gastrointestinal: Negative for abdominal distention and abdominal pain.   Genitourinary: Negative for dyspareunia, dysuria, menstrual problem, pelvic pain, vaginal bleeding, vaginal discharge and vaginal pain.   All other systems reviewed and are negative.    /84   Ht 165.1 cm (65\")   Wt 84.8 kg (187 lb)   LMP 02/23/2021 (Exact Date)   Breastfeeding No   BMI 31.12 kg/m²     Objective   Physical Exam  Vitals and nursing note reviewed.   Constitutional:       Appearance: Normal appearance. She is well-developed. She is obese.   Neck:      Thyroid: No thyromegaly.   Cardiovascular:      Rate and Rhythm: Normal rate and regular rhythm.   Pulmonary:      Effort: Pulmonary effort is normal.      Breath sounds: Normal breath sounds.   Chest:      Breasts:         Right: No mass, nipple discharge, skin change or tenderness.         Left: No mass, nipple discharge, skin change or tenderness.   Abdominal:      General: Bowel sounds are normal. There is no distension.      Palpations: Abdomen is soft.      Tenderness: There is no abdominal tenderness.   Genitourinary:     General: Normal vulva.      Exam position: " Supine.      Labia:         Right: No rash or lesion.         Left: No rash or lesion.       Vagina: Vaginal discharge present.      Cervix: No friability, lesion or cervical bleeding.      Uterus: Enlarged and fixed. Not tender.       Adnexa:         Right: No mass or tenderness.          Left: No mass or tenderness.     Musculoskeletal:         General: Normal range of motion.      Cervical back: Normal range of motion and neck supple.   Skin:     General: Skin is warm and dry.      Findings: No rash.   Neurological:      Mental Status: She is alert and oriented to person, place, and time.   Psychiatric:         Mood and Affect: Mood normal.         Behavior: Behavior normal.           Assessment/Plan   Diagnoses and all orders for this visit:    1. Encounter for gynecological examination without abnormal finding (Primary)  -     IgP, Aptima HPV    2. Breast cancer screening by mammogram  -     Mammo Screening Bilateral With CAD; Future    3. Skin tag of vulva  -     Reference Histopathology    4. Vaginal discharge  -     NuSwab VG+ - Swab, Vagina    5. Dysmenorrhea    6. Abnormal uterine bleeding (AUB)    7. History of condyloma acuminatum    8. History of endometriosis    9. Screening for malignant neoplasm of the cervix  -     IgP, Aptima HPV    Counseling was given to patient for the following topics: instructions for management, risk factor reductions, impressions, risks and benefits of treatment options, importance of treatment compliance and self-breast exams .

## 2021-03-04 LAB
DX ICD CODE: NORMAL
PATH REPORT.FINAL DX SPEC: NORMAL
PATH REPORT.GROSS SPEC: NORMAL
PATH REPORT.RELEVANT HX SPEC: NORMAL
PATH REPORT.SITE OF ORIGIN SPEC: NORMAL
PATHOLOGIST NAME: NORMAL
PAYMENT PROCEDURE: NORMAL

## 2021-03-05 ENCOUNTER — TELEPHONE (OUTPATIENT)
Dept: OBSTETRICS AND GYNECOLOGY | Age: 41
End: 2021-03-05

## 2021-03-05 LAB
A VAGINAE DNA VAG QL NAA+PROBE: NORMAL SCORE
BVAB2 DNA VAG QL NAA+PROBE: NORMAL SCORE
C ALBICANS DNA VAG QL NAA+PROBE: NEGATIVE
C GLABRATA DNA VAG QL NAA+PROBE: NEGATIVE
C TRACH DNA VAG QL NAA+PROBE: NEGATIVE
CYTOLOGIST CVX/VAG CYTO: NORMAL
CYTOLOGY CVX/VAG DOC CYTO: NORMAL
CYTOLOGY CVX/VAG DOC THIN PREP: NORMAL
DX ICD CODE: NORMAL
HIV 1 & 2 AB SER-IMP: NORMAL
HPV I/H RISK 4 DNA CVX QL PROBE+SIG AMP: NEGATIVE
Lab: NORMAL
MEGA1 DNA VAG QL NAA+PROBE: NORMAL SCORE
N GONORRHOEA DNA VAG QL NAA+PROBE: NEGATIVE
OTHER STN SPEC: NORMAL
STAT OF ADQ CVX/VAG CYTO-IMP: NORMAL
T VAGINALIS DNA VAG QL NAA+PROBE: NEGATIVE

## 2021-03-05 NOTE — PROGRESS NOTES
Procedure   Vulvar Skin tag removal    Date/Time: 3/2/2021 2:00 PM  Performed by: Nyla Kelsey MD  Authorized by: Nyla Kelsey MD   Preparation: Patient was prepped and draped in the usual sterile fashion.  Local anesthesia used: yes    Anesthesia:  Local anesthesia used: yes  Local Anesthetic: lidocaine 1% with epinephrine  Anesthetic total: 5 mL    Sedation:  Patient sedated: no    Patient tolerance: patient tolerated the procedure well with no immediate complications  Comments: Betadine used to clean area - lidocaine injected. Scalpel used to remove skin tag. Silver nitrate used for hemostasis.

## 2021-03-08 NOTE — PROGRESS NOTES
Negative results given to patient , she wanted to know if the non ob us results she had past Thursday were normal ?

## 2021-03-19 ENCOUNTER — APPOINTMENT (OUTPATIENT)
Dept: WOMENS IMAGING | Facility: HOSPITAL | Age: 41
End: 2021-03-19

## 2021-03-19 ENCOUNTER — HOSPITAL ENCOUNTER (OUTPATIENT)
Dept: ULTRASOUND IMAGING | Facility: HOSPITAL | Age: 41
Discharge: HOME | End: 2021-03-19
Admitting: FAMILY MEDICINE

## 2021-03-19 DIAGNOSIS — E04.1 THYROID NODULE: Chronic | ICD-10-CM

## 2021-03-19 PROCEDURE — 77067 SCR MAMMO BI INCL CAD: CPT | Performed by: RADIOLOGY

## 2021-03-19 PROCEDURE — 77063 BREAST TOMOSYNTHESIS BI: CPT | Performed by: RADIOLOGY

## 2021-03-19 PROCEDURE — 76536 US EXAM OF HEAD AND NECK: CPT

## 2021-04-02 ENCOUNTER — BULK ORDERING (OUTPATIENT)
Dept: CASE MANAGEMENT | Facility: OTHER | Age: 41
End: 2021-04-02

## 2021-04-02 DIAGNOSIS — Z23 IMMUNIZATION DUE: ICD-10-CM

## 2021-04-08 ENCOUNTER — OFFICE VISIT (OUTPATIENT)
Dept: FAMILY MEDICINE CLINIC | Facility: CLINIC | Age: 41
End: 2021-04-08

## 2021-04-08 VITALS
WEIGHT: 186.2 LBS | OXYGEN SATURATION: 98 % | DIASTOLIC BLOOD PRESSURE: 78 MMHG | HEART RATE: 98 BPM | SYSTOLIC BLOOD PRESSURE: 110 MMHG | HEIGHT: 65 IN | BODY MASS INDEX: 31.02 KG/M2 | TEMPERATURE: 98.2 F

## 2021-04-08 DIAGNOSIS — K58.0 IRRITABLE BOWEL SYNDROME WITH DIARRHEA: Primary | ICD-10-CM

## 2021-04-08 PROCEDURE — 99214 OFFICE O/P EST MOD 30 MIN: CPT | Performed by: FAMILY MEDICINE

## 2021-04-08 RX ORDER — CLONAZEPAM 0.5 MG/1
.25-.5 TABLET ORAL 2 TIMES DAILY PRN
Qty: 30 TABLET | Refills: 0 | Status: SHIPPED | OUTPATIENT
Start: 2021-04-08 | End: 2022-04-26

## 2021-04-08 RX ORDER — AMITRIPTYLINE HYDROCHLORIDE 25 MG/1
25 TABLET, FILM COATED ORAL NIGHTLY
Qty: 30 TABLET | Refills: 1 | Status: SHIPPED | OUTPATIENT
Start: 2021-04-08 | End: 2021-08-24 | Stop reason: SINTOL

## 2021-04-08 RX ORDER — DICYCLOMINE HCL 20 MG
20 TABLET ORAL
COMMUNITY
Start: 2021-03-19

## 2021-04-08 NOTE — PATIENT INSTRUCTIONS
The clonazepam is only to be used in stressful or new situations.  It is not to be used daily.  Make sure to take the amitriptyline every night so that you get used to the side effects.  Call me if you do not tolerate it.

## 2021-04-08 NOTE — PROGRESS NOTES
"Chief Complaint  Anxiety    Subjective          Saskia Fox presents to [facility]  Patient presents to discuss her IBS with diarrhea.  She just saw her GI and they had briefly mentioned to her may be trying antianxiety medicine such as a benzo to help.  She was recently started on dicyclomine and has tried everything else they knows to try.  She is only given the dicyclomine a couple weeks.  She states she has never tried a tricyclic such as amitriptyline.  She is talking to fertility specialist about trying to conceive.  She has almost daily symptoms so I told her what I want to try is only for new or stressful situations and cannot be used daily.  Patient voiced understanding.        Objective   Vital Signs:   /78   Pulse 98   Temp 98.2 °F (36.8 °C)   Ht 165.1 cm (65\")   Wt 84.5 kg (186 lb 3.2 oz)   SpO2 98%   BMI 30.99 kg/m²     Physical Exam  Vitals and nursing note reviewed.   Constitutional:       General: She is not in acute distress.  HENT:      Head: Normocephalic and atraumatic.      Right Ear: External ear normal.      Left Ear: External ear normal.   Eyes:      Conjunctiva/sclera: Conjunctivae normal.      Pupils: Pupils are equal, round, and reactive to light.   Pulmonary:      Effort: No respiratory distress.   Neurological:      Mental Status: She is alert and oriented to person, place, and time.   Psychiatric:         Mood and Affect: Mood normal.         Behavior: Behavior normal.        Result Review :                 Assessment and Plan    Diagnoses and all orders for this visit:    1. Irritable bowel syndrome with diarrhea (Primary)  -     amitriptyline (ELAVIL) 25 MG tablet; Take 1 tablet by mouth Every Night.  Dispense: 30 tablet; Refill: 1  -     clonazePAM (KlonoPIN) 0.5 MG tablet; Take 0.5-1 tablets by mouth 2 (Two) Times a Day As Needed (IBS).  Dispense: 30 tablet; Refill: 0        Follow Up   Return in about 1 month (around 5/8/2021) for " Recheck meds, Video visit.  Patient was given instructions and counseling regarding her condition or for health maintenance advice. Please see specific information pulled into the AVS if appropriate.

## 2021-04-22 ENCOUNTER — TELEPHONE (OUTPATIENT)
Dept: OBSTETRICS AND GYNECOLOGY | Age: 41
End: 2021-04-22

## 2021-04-22 NOTE — TELEPHONE ENCOUNTER
Labs ordered with dr [facility]er office , waiting on results . Patient had labs drawn  at the [place] office .

## 2021-04-22 NOTE — TELEPHONE ENCOUNTER
Please disregard previous message.  Marisela confirmed we have received some of the results back; however, not all have been received and processed.  I will call patient back and inform her.

## 2021-04-22 NOTE — TELEPHONE ENCOUNTER
Patient called inquiring about blood work that was obtained on April 16, 2021.  She states there was some confusion on what blood work that was needed to be ordered and/or the codes to be used.  I am finding no results, orders or anything for DOS 04/16/2021.  Could you please advise me on how I should move forward?  She was referred to Dr. Natasha Reed for infertility.

## 2021-04-23 LAB
25(OH)D3+25(OH)D2 SERPL-MCNC: 26.7 NG/ML (ref 30–100)
ABO GROUP BLD: NORMAL
MIS SERPL-MCNC: 0.62 NG/ML
PROLACTIN SERPL-MCNC: 8.6 NG/ML (ref 4.8–23.3)
RH BLD: POSITIVE
RUBV IGG SERPL IA-ACNC: 17.3 INDEX
THYROPEROXIDASE AB SERPL-ACNC: 67 IU/ML (ref 0–34)
TSH SERPL DL<=0.005 MIU/L-ACNC: 0.03 UIU/ML (ref 0.27–4.2)

## 2021-04-27 ENCOUNTER — TELEPHONE (OUTPATIENT)
Dept: OBSTETRICS AND GYNECOLOGY | Age: 41
End: 2021-04-27

## 2021-04-28 NOTE — TELEPHONE ENCOUNTER
Attempting to fax results to  and fax busy, called  office to see if there is an alternate way.Waiting on return call.dn

## 2021-05-10 ENCOUNTER — TELEMEDICINE (OUTPATIENT)
Dept: FAMILY MEDICINE CLINIC | Facility: CLINIC | Age: 41
End: 2021-05-10

## 2021-05-10 DIAGNOSIS — E03.8 HYPOTHYROIDISM DUE TO HASHIMOTO'S THYROIDITIS: Chronic | ICD-10-CM

## 2021-05-10 DIAGNOSIS — K58.0 IRRITABLE BOWEL SYNDROME WITH DIARRHEA: Primary | Chronic | ICD-10-CM

## 2021-05-10 DIAGNOSIS — E06.3 HYPOTHYROIDISM DUE TO HASHIMOTO'S THYROIDITIS: Chronic | ICD-10-CM

## 2021-05-10 PROCEDURE — 99214 OFFICE O/P EST MOD 30 MIN: CPT | Performed by: FAMILY MEDICINE

## 2021-05-10 RX ORDER — PRENATAL VIT NO.126/IRON/FOLIC 28MG-0.8MG
TABLET ORAL DAILY
COMMUNITY

## 2021-05-10 NOTE — PROGRESS NOTES
This was an audio and video enabled telemedicine encounter.    Chief Complaint  Anxiety (pt no longer taking amitryptiline)    Subjective          Saskia Fox presents to [facility]  Patient presents for video visit.  She could not take the amitriptyline for severe fatigue.  She even tried taking half a pill and could not function.  She has taken the clonazepam very sparingly for her flares in her IBS and it does help.  She is to to recheck her TSH but did not adjust her medication when I last saw her.  Recently had labs within infertility specialist and they recommended improving her thyroid control since her TSH was still low.  Back to her IBS she started taking fiber more regularly and feels it is really helped her bowels along with the dicyclomine.      Objective   Vital Signs:   There were no vitals taken for this visit.    Physical Exam telephone visit, technical difficulties  Result Review :                 Assessment and Plan    Diagnoses and all orders for this visit:    1. Irritable bowel syndrome with diarrhea (Primary)    2. Hypothyroidism due to Hashimoto's thyroiditis    IBS with diarrhea-improved, continue with the dicyclomine and fiber, clonazepam only for stressful situations, try Imodium again, can continue Lomotil if that does not work  Hypothyroidism-adjust medication dropping 1 tab per week and we will recheck labs in 8 weeks and keep routine follow-up this summer    Follow Up   No follow-ups on file.  Patient was given instructions and counseling regarding her condition or for health maintenance advice. Please see specific information pulled into the AVS if appropriate.

## 2021-05-10 NOTE — PATIENT INSTRUCTIONS
Keep follow up in August.  Drop thyroid medication one day per week.   Recheck thyroid labs in 8 weeks.    For the stall in weight loss, log all your food for the next week and then go back and count calories afterward to see what your real consumption is.

## 2021-07-06 ENCOUNTER — LAB (OUTPATIENT)
Dept: FAMILY MEDICINE CLINIC | Facility: CLINIC | Age: 41
End: 2021-07-06

## 2021-07-06 VITALS — TEMPERATURE: 99.8 F

## 2021-07-06 DIAGNOSIS — E03.8 HYPOTHYROIDISM DUE TO HASHIMOTO'S THYROIDITIS: Primary | ICD-10-CM

## 2021-07-06 DIAGNOSIS — E06.3 HYPOTHYROIDISM DUE TO HASHIMOTO'S THYROIDITIS: Primary | ICD-10-CM

## 2021-07-07 LAB — TSH SERPL DL<=0.005 MIU/L-ACNC: 0.06 UIU/ML (ref 0.45–4.5)

## 2021-07-08 DIAGNOSIS — E03.8 HYPOTHYROIDISM DUE TO HASHIMOTO'S THYROIDITIS: Primary | ICD-10-CM

## 2021-07-08 DIAGNOSIS — E06.3 HYPOTHYROIDISM DUE TO HASHIMOTO'S THYROIDITIS: Primary | ICD-10-CM

## 2021-07-08 RX ORDER — LEVOTHYROXINE SODIUM 0.05 MG/1
50 TABLET ORAL DAILY
Qty: 90 TABLET | Refills: 1 | Status: SHIPPED | OUTPATIENT
Start: 2021-07-08 | End: 2021-08-24 | Stop reason: DRUGHIGH

## 2021-08-23 DIAGNOSIS — E06.3 HYPOTHYROIDISM DUE TO HASHIMOTO'S THYROIDITIS: ICD-10-CM

## 2021-08-23 DIAGNOSIS — E03.8 HYPOTHYROIDISM DUE TO HASHIMOTO'S THYROIDITIS: ICD-10-CM

## 2021-08-23 RX ORDER — LEVOTHYROXINE SODIUM 0.07 MG/1
75 TABLET ORAL DAILY
Qty: 90 TABLET | Refills: 1 | Status: SHIPPED | OUTPATIENT
Start: 2021-08-23 | End: 2021-08-24

## 2021-08-24 ENCOUNTER — OFFICE VISIT (OUTPATIENT)
Dept: FAMILY MEDICINE CLINIC | Facility: CLINIC | Age: 41
End: 2021-08-24

## 2021-08-24 VITALS
OXYGEN SATURATION: 96 % | WEIGHT: 184.8 LBS | BODY MASS INDEX: 30.79 KG/M2 | HEART RATE: 78 BPM | SYSTOLIC BLOOD PRESSURE: 122 MMHG | TEMPERATURE: 98.9 F | HEIGHT: 65 IN | DIASTOLIC BLOOD PRESSURE: 74 MMHG

## 2021-08-24 DIAGNOSIS — K58.0 IRRITABLE BOWEL SYNDROME WITH DIARRHEA: Chronic | ICD-10-CM

## 2021-08-24 DIAGNOSIS — E03.8 HYPOTHYROIDISM DUE TO HASHIMOTO'S THYROIDITIS: Primary | Chronic | ICD-10-CM

## 2021-08-24 DIAGNOSIS — E66.09 CLASS 1 OBESITY DUE TO EXCESS CALORIES WITHOUT SERIOUS COMORBIDITY WITH BODY MASS INDEX (BMI) OF 30.0 TO 30.9 IN ADULT: ICD-10-CM

## 2021-08-24 DIAGNOSIS — E06.3 HYPOTHYROIDISM DUE TO HASHIMOTO'S THYROIDITIS: Primary | Chronic | ICD-10-CM

## 2021-08-24 PROCEDURE — 99214 OFFICE O/P EST MOD 30 MIN: CPT | Performed by: FAMILY MEDICINE

## 2021-08-24 RX ORDER — LEVOTHYROXINE SODIUM 0.03 MG/1
25 TABLET ORAL DAILY
Qty: 90 TABLET | Refills: 0 | Status: SHIPPED | OUTPATIENT
Start: 2021-08-24 | End: 2022-04-26

## 2021-08-24 NOTE — PATIENT INSTRUCTIONS
Calorie Counting for Weight Loss  Calories are units of energy. Your body needs a certain number of calories from food to keep going throughout the day. When you eat or drink more calories than your body needs, your body stores the extra calories mostly as fat. When you eat or drink fewer calories than your body needs, your body burns fat to get the energy it needs.  Calorie counting means keeping track of how many calories you eat and drink each day. Calorie counting can be helpful if you need to lose weight. If you eat fewer calories than your body needs, you should lose weight. Ask your health care provider what a healthy weight is for you.  For calorie counting to work, you will need to eat the right number of calories each day to lose a healthy amount of weight per week. A dietitian can help you figure out how many calories you need in a day and will suggest ways to reach your calorie goal.  · A healthy amount of weight to lose each week is usually 1-2 lb (0.5-0.9 kg). This usually means that your daily calorie intake should be reduced by 500-750 calories.  · Eating 1,200-1,500 calories a day can help most women lose weight.  · Eating 1,500-1,800 calories a day can help most men lose weight.  What do I need to know about calorie counting?  Work with your health care provider or dietitian to determine how many calories you should get each day. To meet your daily calorie goal, you will need to:  · Find out how many calories are in each food that you would like to eat. Try to do this before you eat.  · Decide how much of the food you plan to eat.  · Keep a food log. Do this by writing down what you ate and how many calories it had.  To successfully lose weight, it is important to balance calorie counting with a healthy lifestyle that includes regular activity.  Where do I find calorie information?    The number of calories in a food can be found on a Nutrition Facts label. If a food does not have a Nutrition Facts  label, try to look up the calories online or ask your dietitian for help.  Remember that calories are listed per serving. If you choose to have more than one serving of a food, you will have to multiply the calories per serving by the number of servings you plan to eat. For example, the label on a package of bread might say that a serving size is 1 slice and that there are 90 calories in a serving. If you eat 1 slice, you will have eaten 90 calories. If you eat 2 slices, you will have eaten 180 calories.  How do I keep a food log?  After each time that you eat, record the following in your food log as soon as possible:  · What you ate. Be sure to include toppings, sauces, and other extras on the food.  · How much you ate. This can be measured in cups, ounces, or number of items.  · How many calories were in each food and drink.  · The total number of calories in the food you ate.  Keep your food log near you, such as in a pocket-sized notebook or on an david or website on your mobile phone. Some programs will calculate calories for you and show you how many calories you have left to meet your daily goal.  What are some portion-control tips?  · Know how many calories are in a serving. This will help you know how many servings you can have of a certain food.  · Use a measuring cup to measure serving sizes. You could also try weighing out portions on a kitchen scale. With time, you will be able to estimate serving sizes for some foods.  · Take time to put servings of different foods on your favorite plates or in your favorite bowls and cups so you know what a serving looks like.  · Try not to eat straight from a food's packaging, such as from a bag or box. Eating straight from the package makes it hard to see how much you are eating and can lead to overeating. Put the amount you would like to eat in a cup or on a plate to make sure you are eating the right portion.  · Use smaller plates, glasses, and bowls for smaller  portions and to prevent overeating.  · Try not to multitask. For example, avoid watching TV or using your computer while eating. If it is time to eat, sit down at a table and enjoy your food. This will help you recognize when you are full. It will also help you be more mindful of what and how much you are eating.  What are tips for following this plan?  Reading food labels  · Check the calorie count compared with the serving size. The serving size may be smaller than what you are used to eating.  · Check the source of the calories. Try to choose foods that are high in protein, fiber, and vitamins, and low in saturated fat, trans fat, and sodium.  Shopping  · Read nutrition labels while you shop. This will help you make healthy decisions about which foods to buy.  · Pay attention to nutrition labels for low-fat or fat-free foods. These foods sometimes have the same number of calories or more calories than the full-fat versions. They also often have added sugar, starch, or salt to make up for flavor that was removed with the fat.  · Make a grocery list of lower-calorie foods and stick to it.  Cooking  · Try to cook your favorite foods in a healthier way. For example, try baking instead of frying.  · Use low-fat dairy products.  Meal planning  · Use more fruits and vegetables. One-half of your plate should be fruits and vegetables.  · Include lean proteins, such as chicken, turkey, and fish.  Lifestyle  Each week, aim to do one of the following:  · 150 minutes of moderate exercise, such as walking.  · 75 minutes of vigorous exercise, such as running.  General information  · Know how many calories are in the foods you eat most often. This will help you calculate calorie counts faster.  · Find a way of tracking calories that works for you. Get creative. Try different apps or programs if writing down calories does not work for you.  What foods should I eat?    · Eat nutritious foods. It is better to have a nutritious,  high-calorie food, such as an avocado, than a food with few nutrients, such as a bag of potato chips.  · Use your calories on foods and drinks that will fill you up and will not leave you hungry soon after eating.  ? Examples of foods that fill you up are nuts and nut butters, vegetables, lean proteins, and high-fiber foods such as whole grains. High-fiber foods are foods with more than 5 g of fiber per serving.  · Pay attention to calories in drinks. Low-calorie drinks include water and unsweetened drinks.  The items listed above may not be a complete list of foods and beverages you can eat. Contact a dietitian for more information.  What foods should I limit?  Limit foods or drinks that are not good sources of vitamins, minerals, or protein or that are high in unhealthy fats. These include:  · Candy.  · Other sweets.  · Sodas, specialty coffee drinks, alcohol, and juice.  The items listed above may not be a complete list of foods and beverages you should avoid. Contact a dietitian for more information.  How do I count calories when eating out?  · Pay attention to portions. Often, portions are much larger when eating out. Try these tips to keep portions smaller:  ? Consider sharing a meal instead of getting your own.  ? If you get your own meal, eat only half of it. Before you start eating, ask for a container and put half of your meal into it.  ? When available, consider ordering smaller portions from the menu instead of full portions.  · Pay attention to your food and drink choices. Knowing the way food is cooked and what is included with the meal can help you eat fewer calories.  ? If calories are listed on the menu, choose the lower-calorie options.  ? Choose dishes that include vegetables, fruits, whole grains, low-fat dairy products, and lean proteins.  ? Choose items that are boiled, broiled, grilled, or steamed. Avoid items that are buttered, battered, fried, or served with cream sauce. Items labeled as  crispy are usually fried, unless stated otherwise.  ? Choose water, low-fat milk, unsweetened iced tea, or other drinks without added sugar. If you want an alcoholic beverage, choose a lower-calorie option, such as a glass of wine or light beer.  ? Ask for dressings, sauces, and syrups on the side. These are usually high in calories, so you should limit the amount you eat.  ? If you want a salad, choose a garden salad and ask for grilled meats. Avoid extra toppings such as light, cheese, or fried items. Ask for the dressing on the side, or ask for olive oil and vinegar or lemon to use as dressing.  · Estimate how many servings of a food you are given. Knowing serving sizes will help you be aware of how much food you are eating at restaurants.  Where to find more information  · Centers for Disease Control and Prevention: www.cdc.gov  · U.[place]. Department of Agriculture: myplate.gov  Summary  · Calorie counting means keeping track of how many calories you eat and drink each day. If you eat fewer calories than your body needs, you should lose weight.  · A healthy amount of weight to lose per week is usually 1-2 lb (0.5-0.9 kg). This usually means reducing your daily calorie intake by 500-750 calories.  · The number of calories in a food can be found on a Nutrition Facts label. If a food does not have a Nutrition Facts label, try to look up the calories online or ask your dietitian for help.  · Use smaller plates, glasses, and bowls for smaller portions and to prevent overeating.  · Use your calories on foods and drinks that will fill you up and not leave you hungry shortly after a meal.  This information is not intended to replace advice given to you by your health care provider. Make sure you discuss any questions you have with your health care provider.  Document Revised: 01/28/2021 Document Reviewed: 01/28/2021  Elsevier Patient Education © 2021 [employer]

## 2021-08-24 NOTE — PROGRESS NOTES
"Chief Complaint  Hypothyroidism    Subjective          Saskia Fox presents to [facility]  Patient presents for follow-up of her thyroid.  She has been seeing Dr. Kelsey for reproductive issues and her TSH was just checked 6 weeks ago.  TSH is still low but not as bad as when I first started monitoring her thyroid.  She is up-to-date with her thyroid ultrasound.  She is wondering about seeing an endocrinologist but I explained I am not sure what they could offer to help with those issues since she just needs medication titration at this point.  IBS is under control with over-the-counter antidiarrheals and the rare Klonopin.  She does not need a refill of that today.  She did not tolerate the amitriptyline because it made her too sleepy.        Objective   Vital Signs:   /74   Pulse 78   Temp 98.9 °F (37.2 °C)   Ht 165.1 cm (65\")   Wt 83.8 kg (184 lb 12.8 oz)   SpO2 96%   BMI 30.75 kg/m²     Physical Exam  Constitutional:       General: She is not in acute distress.     Appearance: Normal appearance. She is well-developed.   HENT:      Head: Normocephalic and atraumatic.      Right Ear: Tympanic membrane, ear canal and external ear normal.      Left Ear: Tympanic membrane, ear canal and external ear normal.      Mouth/Throat:      Mouth: Mucous membranes are moist.      Pharynx: Oropharynx is clear.   Eyes:      Conjunctiva/sclera: Conjunctivae normal.      Pupils: Pupils are equal, round, and reactive to light.   Neck:      Thyroid: No thyromegaly.   Cardiovascular:      Rate and Rhythm: Normal rate and regular rhythm.      Heart sounds: No murmur heard.     Pulmonary:      Effort: Pulmonary effort is normal.      Breath sounds: Normal breath sounds. No wheezing.   Musculoskeletal:         General: Normal range of motion.      Cervical back: Neck supple.   Lymphadenopathy:      Cervical: No cervical adenopathy.   Skin:     General: Skin is warm and dry.   Neurological:      " Mental Status: She is alert and oriented to person, place, and time.   Psychiatric:         Mood and Affect: Mood normal.         Behavior: Behavior normal.        Result Review :            US Thyroid (03/19/2021 10:43)  TSH (07/06/2021 15:38)       Assessment and Plan    Diagnoses and all orders for this visit:    1. Hypothyroidism due to Hashimoto's thyroiditis (Primary)  -     levothyroxine (SYNTHROID, LEVOTHROID) 25 MCG tablet; Take 1 tablet by mouth Daily.  Dispense: 90 tablet; Refill: 0  -     Ambulatory Referral to Endocrinology  -     TSH; Future    2. Irritable bowel syndrome with diarrhea    3. Class 1 obesity due to excess calories without serious comorbidity with body mass index (BMI) of 30.0 to 30.9 in adult    Hypothyroidism-TSH too low if still, will decrease levothyroxine to 25 mcg, will put an endocrinology referral at patient's request but she understands it will take months  IBS-stable  Obesity-discussed calorie counting for weight loss with patient    Follow Up   Return in about 6 months (around 2/24/2022) for Annual physical.  Patient was given instructions and counseling regarding her condition or for health maintenance advice. Please see specific information pulled into the AVS if appropriate.

## 2021-08-30 ENCOUNTER — TELEPHONE (OUTPATIENT)
Dept: FAMILY MEDICINE CLINIC | Facility: CLINIC | Age: 41
End: 2021-08-30

## 2021-09-07 ENCOUNTER — PATIENT MESSAGE (OUTPATIENT)
Dept: FAMILY MEDICINE CLINIC | Facility: CLINIC | Age: 41
End: 2021-09-07

## 2021-09-07 NOTE — TELEPHONE ENCOUNTER
From: Saskia Fox  To: Meredith Lea Kehrer, MD  Sent: 9/7/2021 9:29 AM EDT  Subject: Referral Request    I had a referral request to see a thyroid specialist but it was in [place]. I would prefer to see someone closer, so that office told me they would send the referral back and that I needed to call the office as well..which I did. I'm just wondering if there was any progress on finding a specialist closer to me? It doesn't have to be affiliated with [facility].

## 2021-09-14 ENCOUNTER — TELEPHONE (OUTPATIENT)
Dept: OBSTETRICS AND GYNECOLOGY | Age: 41
End: 2021-09-14

## 2021-09-14 DIAGNOSIS — N94.6 DYSMENORRHEA: ICD-10-CM

## 2021-09-14 DIAGNOSIS — N93.9 ABNORMAL UTERINE BLEEDING (AUB): Primary | ICD-10-CM

## 2021-09-14 DIAGNOSIS — Z87.42 HISTORY OF ENDOMETRIOSIS: ICD-10-CM

## 2021-09-14 RX ORDER — NORETHINDRONE ACETATE AND ETHINYL ESTRADIOL 1MG-20(21)
1 KIT ORAL DAILY
Qty: 84 TABLET | Refills: 3 | Status: SHIPPED | OUTPATIENT
Start: 2021-09-14 | End: 2021-09-14 | Stop reason: SDUPTHER

## 2021-09-14 RX ORDER — NORETHINDRONE ACETATE AND ETHINYL ESTRADIOL 1; .02 MG/1; MG/1
1 TABLET ORAL DAILY
Qty: 63 TABLET | Refills: 3 | Status: SHIPPED | OUTPATIENT
Start: 2021-09-14 | End: 2022-05-05

## 2021-09-14 NOTE — TELEPHONE ENCOUNTER
----- Message from Angeles Tyler MA sent at 9/14/2021 11:04 AM EDT -----  Regarding: FW: Prescription Question  Contact: [phone]    ----- Message -----  From: Saskia Fox  Sent: 9/14/2021  10:01 AM EDT  To: Claude Moraes [facility]  Subject: Prescription Question                            I was wanting to get a birth control prescription refilled. We were trying to get pregnant but not having luck in that department.  My doctor (Toma Vera) before I transferred back to [place] had me on Norethindrone Acet/Eth 1/20 Tb 21's  My cramps are getting pretty bad so I thought I would go back on my pill for a bit and then try again later. Dr. Kelsey had mentioned to just let her know when I want to get back on my Birth control.  My Pharmacy is [facility] here in [place].  I've attached photos of what I was taking.

## 2021-11-01 ENCOUNTER — TELEPHONE (OUTPATIENT)
Dept: FAMILY MEDICINE CLINIC | Facility: CLINIC | Age: 41
End: 2021-11-01

## 2022-04-26 ENCOUNTER — OFFICE VISIT (OUTPATIENT)
Dept: OBSTETRICS AND GYNECOLOGY | Age: 42
End: 2022-04-26

## 2022-04-26 VITALS
SYSTOLIC BLOOD PRESSURE: 148 MMHG | WEIGHT: 191 LBS | BODY MASS INDEX: 33.84 KG/M2 | DIASTOLIC BLOOD PRESSURE: 95 MMHG | HEIGHT: 63 IN

## 2022-04-26 DIAGNOSIS — Z87.42 HISTORY OF ENDOMETRIOSIS: ICD-10-CM

## 2022-04-26 DIAGNOSIS — Z01.419 ENCOUNTER FOR GYNECOLOGICAL EXAMINATION WITHOUT ABNORMAL FINDING: Primary | ICD-10-CM

## 2022-04-26 DIAGNOSIS — N93.9 ABNORMAL UTERINE BLEEDING (AUB): ICD-10-CM

## 2022-04-26 DIAGNOSIS — N94.6 DYSMENORRHEA: ICD-10-CM

## 2022-04-26 DIAGNOSIS — Z12.31 BREAST CANCER SCREENING BY MAMMOGRAM: ICD-10-CM

## 2022-04-26 DIAGNOSIS — Z12.4 SCREENING FOR MALIGNANT NEOPLASM OF THE CERVIX: ICD-10-CM

## 2022-04-26 PROCEDURE — 99396 PREV VISIT EST AGE 40-64: CPT | Performed by: OBSTETRICS & GYNECOLOGY

## 2022-04-26 RX ORDER — ONDANSETRON 4 MG/1
4 TABLET, FILM COATED ORAL EVERY 8 HOURS PRN
COMMUNITY

## 2022-04-26 RX ORDER — METHIMAZOLE 5 MG/1
5 TABLET ORAL DAILY
COMMUNITY

## 2022-04-26 NOTE — PROGRESS NOTES
"Subjective   Saskia Fox is a 42 y.o. female c: annual visit today , last pap 3/2/21 neg , contraception - ocp continuously - no periods  , [facility], no gyno complaints , h/o graves disease and hashimoto's. Advised to f/u with PCP asap regarding BP and call me - may need to switch to POP. Also wants to discuss weight mgmt with PCP. Very happy on pill and reports it controls her period. No longer desires fertility.             I last saw her last year to reestablish care today / previous care with dr burgos -cc: annual visit today , last pap 2019 neg @ [facility] obrien , last mmg 2019 [facility] , stopped taking bcp back in october trying to get pregnant , has a boyfriend but worries about the age . wants to discuss fertility with you.  Lengthy discussion regarding age and no time to waste and rec of fertility specialist.  Boyfriend has fathered two children.        History of Present Illness    The following portions of the patient's history were reviewed and updated as appropriate: allergies, current medications, past family history, past medical history, past social history, past surgical history and problem list.    Review of Systems   Constitutional: Positive for unexpected weight change. Negative for chills, fatigue and fever.   Endocrine:        + decreased libido    Genitourinary: Negative for dysuria, menstrual problem, pelvic pain, vaginal bleeding, vaginal discharge and vaginal pain.   All other systems reviewed and are negative.  /95   Ht 160 cm (63\")   Wt 86.6 kg (191 lb)   LMP  (LMP Unknown)   Breastfeeding No   BMI 33.83 kg/m²     Objective   Physical Exam  Vitals and nursing note reviewed.   Constitutional:       Appearance: Normal appearance. She is well-developed. She is obese.   Neck:      Thyroid: No thyromegaly.   Pulmonary:      Effort: Pulmonary effort is normal.   Chest:   Breasts:      Right: No mass, nipple discharge, skin change or " tenderness.      Left: No mass, nipple discharge, skin change or tenderness.       Abdominal:      General: There is no distension.      Palpations: Abdomen is soft.      Tenderness: There is no abdominal tenderness.   Genitourinary:     General: Normal vulva.      Exam position: Lithotomy position.      Labia:         Right: No rash or lesion.         Left: No rash or lesion.       Vagina: Normal. No vaginal discharge or bleeding.      Cervix: No friability, lesion or cervical bleeding.      Uterus: Not enlarged and not tender.       Adnexa:         Right: No mass or tenderness.          Left: No mass or tenderness.        Comments: Exam limited by body habitus    Musculoskeletal:         General: Normal range of motion.      Cervical back: Normal range of motion.   Skin:     General: Skin is warm and dry.      Findings: No rash.   Neurological:      Mental Status: She is alert and oriented to person, place, and time.   Psychiatric:         Mood and Affect: Mood normal.         Behavior: Behavior normal.           Assessment/Plan   Diagnoses and all orders for this visit:    1. Encounter for gynecological examination without abnormal finding (Primary)    2. Abnormal uterine bleeding (AUB)    3. Dysmenorrhea    4. History of endometriosis    5. Breast cancer screening by mammogram  -     Mammo Screening Digital Tomosynthesis Bilateral With CAD; Future      Counseling was given to patient for the following topics: instructions for management, impressions, risks and benefits of treatment options, importance of treatment compliance and self-breast exams .   Return in about 1 year (around 4/26/2023) for Annual physical.

## 2022-05-02 LAB
CYTOLOGIST CVX/VAG CYTO: NORMAL
CYTOLOGY CVX/VAG DOC CYTO: NORMAL
CYTOLOGY CVX/VAG DOC THIN PREP: NORMAL
DX ICD CODE: NORMAL
HIV 1 & 2 AB SER-IMP: NORMAL
HPV I/H RISK 4 DNA CVX QL PROBE+SIG AMP: NEGATIVE
OTHER STN SPEC: NORMAL
STAT OF ADQ CVX/VAG CYTO-IMP: NORMAL

## 2022-05-04 ENCOUNTER — TELEPHONE (OUTPATIENT)
Dept: OBSTETRICS AND GYNECOLOGY | Age: 42
End: 2022-05-04

## 2022-05-04 NOTE — TELEPHONE ENCOUNTER
Dr. Kelsey Patient:  Patient states she just left her PCP and her blood pressure was elevated.  She states Dr. Kelsey was going to possibly change her birth control if her blood pressure remained elevated.  Her B/P was 140/104.  She is going to start a low dose B/P medication but cannot recall the name of the medication.  She states she likes the birth contol pill she is currently taking because she doesn't have monthly cycles and is wanting to know if there is something similar you can prescribe.  Please advise.

## 2022-05-05 ENCOUNTER — HOSPITAL ENCOUNTER (OUTPATIENT)
Dept: MAMMOGRAPHY | Facility: HOSPITAL | Age: 42
End: 2022-05-05

## 2022-05-05 RX ORDER — ACETAMINOPHEN AND CODEINE PHOSPHATE 120; 12 MG/5ML; MG/5ML
1 SOLUTION ORAL DAILY
Qty: 28 TABLET | Refills: 12 | Status: SHIPPED | OUTPATIENT
Start: 2022-05-05 | End: 2023-05-05

## 2022-05-13 ENCOUNTER — HOSPITAL ENCOUNTER (OUTPATIENT)
Dept: MAMMOGRAPHY | Facility: HOSPITAL | Age: 42
Discharge: HOME | End: 2022-05-13
Admitting: OBSTETRICS & GYNECOLOGY

## 2022-05-13 DIAGNOSIS — Z12.31 BREAST CANCER SCREENING BY MAMMOGRAM: ICD-10-CM

## 2022-05-13 PROCEDURE — 77063 BREAST TOMOSYNTHESIS BI: CPT

## 2022-05-13 PROCEDURE — 77067 SCR MAMMO BI INCL CAD: CPT

## 2022-05-16 NOTE — PROGRESS NOTES
Call patient and notify of normal results - of mmg No mammographic evidence of malignancy. Recommend annual screening  mammogram in one year.    BI-RADS Category 1: Negative

## 2023-05-02 ENCOUNTER — OFFICE VISIT (OUTPATIENT)
Dept: OBSTETRICS AND GYNECOLOGY | Age: 43
End: 2023-05-02
Payer: COMMERCIAL

## 2023-05-02 VITALS
DIASTOLIC BLOOD PRESSURE: 80 MMHG | HEIGHT: 64 IN | BODY MASS INDEX: 32.61 KG/M2 | SYSTOLIC BLOOD PRESSURE: 124 MMHG | WEIGHT: 191 LBS

## 2023-05-02 DIAGNOSIS — Z12.31 BREAST CANCER SCREENING BY MAMMOGRAM: ICD-10-CM

## 2023-05-02 DIAGNOSIS — Z01.419 ENCOUNTER FOR GYNECOLOGICAL EXAMINATION WITHOUT ABNORMAL FINDING: Primary | ICD-10-CM

## 2023-05-02 DIAGNOSIS — Z12.4 SCREENING FOR MALIGNANT NEOPLASM OF THE CERVIX: ICD-10-CM

## 2023-05-02 DIAGNOSIS — Z87.42 HISTORY OF ENDOMETRIOSIS: ICD-10-CM

## 2023-05-02 DIAGNOSIS — N93.9 ABNORMAL UTERINE BLEEDING (AUB): ICD-10-CM

## 2023-05-02 DIAGNOSIS — N94.6 DYSMENORRHEA: ICD-10-CM

## 2023-05-02 PROBLEM — I10 ESSENTIAL HYPERTENSION: Status: ACTIVE | Noted: 2023-05-02

## 2023-05-02 RX ORDER — PHENTERMINE HYDROCHLORIDE 37.5 MG/1
37.5 TABLET ORAL
COMMUNITY
Start: 2023-04-23

## 2023-05-02 RX ORDER — AMITRIPTYLINE HYDROCHLORIDE 25 MG/1
25 TABLET, FILM COATED ORAL
COMMUNITY
Start: 2023-01-29

## 2023-05-02 RX ORDER — LOSARTAN POTASSIUM 25 MG/1
50 TABLET ORAL DAILY
COMMUNITY

## 2023-05-02 RX ORDER — CLONAZEPAM 0.5 MG/1
1 TABLET ORAL EVERY 12 HOURS SCHEDULED
COMMUNITY
Start: 2023-03-29

## 2023-05-02 NOTE — PROGRESS NOTES
"Subjective   Saskia Fox is a 43 y.o. female presents for annual visit today - last pap 4/26/22 neg , last mammo 3/19/21 @ [facility] - not scheduled this yr ,contraception - ocp - usually periods are heavy but the last cycle was lighter lasted 2 wks more like spotting.  We will try Slynd to see if she likes better than Micronor - discussed how to skip period.      I last saw her last year for annual visit.            History of Present Illness    The following portions of the patient's history were reviewed and updated as appropriate: allergies, current medications, past family history, past medical history, past social history, past surgical history and problem list.    Review of Systems   Constitutional: Negative for chills, fatigue and fever.   Genitourinary: Positive for menstrual problem. Negative for dyspareunia, dysuria, pelvic pain, vaginal bleeding, vaginal discharge and vaginal pain.   All other systems reviewed and are negative.    /80   Ht 162.6 cm (64\")   Wt 86.6 kg (191 lb)   LMP 04/15/2023   BMI 32.79 kg/m²     Objective   Physical Exam  Vitals and nursing note reviewed.   Constitutional:       Appearance: Normal appearance. She is well-developed.   Neck:      Thyroid: No thyromegaly.   Pulmonary:      Effort: Pulmonary effort is normal.   Chest:   Breasts:     Right: No mass, nipple discharge, skin change or tenderness.      Left: No mass, nipple discharge, skin change or tenderness.   Abdominal:      General: There is no distension.      Palpations: Abdomen is soft.      Tenderness: There is no abdominal tenderness.   Genitourinary:     General: Normal vulva.      Exam position: Lithotomy position.      Labia:         Right: No rash or lesion.         Left: No rash or lesion.       Vagina: Normal. No vaginal discharge or bleeding.      Cervix: No friability or lesion.      Uterus: Not enlarged and not tender.       Adnexa:         Right: No mass or tenderness.          Left: No mass or " tenderness.     Musculoskeletal:         General: Normal range of motion.      Cervical back: Normal range of motion.   Skin:     General: Skin is warm and dry.      Findings: No rash.   Neurological:      Mental Status: She is alert and oriented to person, place, and time.   Psychiatric:         Mood and Affect: Mood normal.         Behavior: Behavior normal.           Assessment & Plan   Diagnoses and all orders for this visit:    1. Encounter for gynecological examination without abnormal finding (Primary)    2. Screening for malignant neoplasm of the cervix  -     IgP, Aptima HPV    3. Breast cancer screening by mammogram  -     Mammo Screening Digital Tomosynthesis Bilateral With CAD; Future    4. History of endometriosis    5. Dysmenorrhea    6. Abnormal uterine bleeding (AUB)      Counseling was given to patient for the following topics: instructions for management, importance of treatment compliance and self-breast exams .  Return in about 1 year (around 5/2/2024) for Annual physical.

## 2023-05-05 LAB
A VAGINAE DNA VAG QL NAA+PROBE: NORMAL SCORE
BVAB2 DNA VAG QL NAA+PROBE: NORMAL SCORE
C ALBICANS DNA VAG QL NAA+PROBE: NEGATIVE
C GLABRATA DNA VAG QL NAA+PROBE: NEGATIVE
C TRACH DNA VAG QL NAA+PROBE: NEGATIVE
MEGA1 DNA VAG QL NAA+PROBE: NORMAL SCORE
N GONORRHOEA DNA VAG QL NAA+PROBE: NEGATIVE
T VAGINALIS DNA VAG QL NAA+PROBE: NEGATIVE

## 2023-05-22 ENCOUNTER — APPOINTMENT (OUTPATIENT)
Dept: WOMENS IMAGING | Facility: HOSPITAL | Age: 43
End: 2023-05-22

## 2023-05-22 PROCEDURE — 77067 SCR MAMMO BI INCL CAD: CPT | Performed by: RADIOLOGY

## 2023-05-22 PROCEDURE — 77063 BREAST TOMOSYNTHESIS BI: CPT | Performed by: RADIOLOGY

## 2023-08-16 ENCOUNTER — TELEPHONE (OUTPATIENT)
Dept: OBSTETRICS AND GYNECOLOGY | Age: 43
End: 2023-08-16
Payer: COMMERCIAL

## 2023-08-16 NOTE — TELEPHONE ENCOUNTER
Pt calling and has been on samples of Slynd and would like the prescription called into the pharmacy on file.     Pharmacy verified     Please advise

## 2023-09-05 ENCOUNTER — TELEPHONE (OUTPATIENT)
Dept: OBSTETRICS AND GYNECOLOGY | Age: 43
End: 2023-09-05
Payer: COMMERCIAL

## 2023-09-05 NOTE — TELEPHONE ENCOUNTER
Pt calling stating Slynd is not covered by her insurance and would like to know what to do next    Please advise

## 2023-09-05 NOTE — TELEPHONE ENCOUNTER
We can give her samples and I can work on PA in the meantime as soon pharmacy sends me one . I will also speak with Fausto and see if we can send a [facility]

## 2024-04-26 ENCOUNTER — TELEPHONE (OUTPATIENT)
Dept: OBSTETRICS AND GYNECOLOGY | Age: 44
End: 2024-04-26
Payer: COMMERCIAL

## 2024-04-26 NOTE — TELEPHONE ENCOUNTER
Caller: Saskia Fox    Relationship: Self    Best call back number: [phone]      Who are you requesting to speak with (clinical staff, DR. WILSON      What was the call regarding: PATIENT IS REQUESTING 1 MORE PACK OF SLYND.

## 2024-04-26 NOTE — TELEPHONE ENCOUNTER
I lvm letting pt know that we have a sample of Slynd at  for her to  and that we close at 4:30 pm and open Monday at 8:00 am.

## 2024-05-14 ENCOUNTER — OFFICE VISIT (OUTPATIENT)
Dept: OBSTETRICS AND GYNECOLOGY | Age: 44
End: 2024-05-14
Payer: COMMERCIAL

## 2024-05-14 VITALS
SYSTOLIC BLOOD PRESSURE: 120 MMHG | BODY MASS INDEX: 34.66 KG/M2 | WEIGHT: 203 LBS | HEIGHT: 64 IN | DIASTOLIC BLOOD PRESSURE: 84 MMHG

## 2024-05-14 DIAGNOSIS — N94.6 DYSMENORRHEA: ICD-10-CM

## 2024-05-14 DIAGNOSIS — N93.9 ABNORMAL UTERINE BLEEDING (AUB): ICD-10-CM

## 2024-05-14 DIAGNOSIS — Z12.31 BREAST CANCER SCREENING BY MAMMOGRAM: ICD-10-CM

## 2024-05-14 DIAGNOSIS — Z86.19 HISTORY OF CONDYLOMA ACUMINATUM: ICD-10-CM

## 2024-05-14 DIAGNOSIS — Z12.4 SCREENING FOR MALIGNANT NEOPLASM OF THE CERVIX: ICD-10-CM

## 2024-05-14 DIAGNOSIS — D22.9 ATYPICAL MOLE: ICD-10-CM

## 2024-05-14 DIAGNOSIS — Z87.42 HISTORY OF ENDOMETRIOSIS: ICD-10-CM

## 2024-05-14 DIAGNOSIS — Z01.419 ENCOUNTER FOR GYNECOLOGICAL EXAMINATION WITHOUT ABNORMAL FINDING: Primary | ICD-10-CM

## 2024-05-14 PROBLEM — E05.00 TOXIC DIFFUSE GOITER: Status: ACTIVE | Noted: 2024-01-22

## 2024-05-14 PROCEDURE — 99396 PREV VISIT EST AGE 40-64: CPT | Performed by: OBSTETRICS & GYNECOLOGY

## 2024-05-14 PROCEDURE — 99459 PELVIC EXAMINATION: CPT | Performed by: OBSTETRICS & GYNECOLOGY

## 2024-05-14 RX ORDER — FAMOTIDINE 20 MG/1
20 TABLET, FILM COATED ORAL
COMMUNITY
Start: 2024-05-08

## 2024-05-14 NOTE — PROGRESS NOTES
"Subjective   Saskia Fox is a 44 y.o. female presents for  annual visit today - last pap 5/2/23 neg , last mammo 5/13/22 @ [facility] needs order to schedule ,contraception slynd- cramps starting to come back and had some BTB in January this year have no periods on slynd taking  it  continuously.  Colonoscopy 5/3/24 benign , having thyroid removal next Tuesday - dr indy Tan.  Noticed the cramping coming back.  Discussed other options including Mirena IUD and endometrial ablation.  Patient would like to get her thyroid taken care of first and then come back to me and discuss options.  Will refer to dermatology for overall mole check and mole on mons.  I last saw her last year for annual visit.   We will try Slynd to see if she likes better than Micronor - discussed how to skip period.       History of Present Illness    The following portions of the patient's history were reviewed and updated as appropriate: allergies, current medications, past family history, past medical history, past social history, past surgical history, and problem list.    Review of Systems   Constitutional:  Negative for chills, fatigue and fever.   Genitourinary:  Positive for menstrual problem and pelvic pain. Negative for dyspareunia, dysuria, vaginal bleeding, vaginal discharge and vaginal pain.   All other systems reviewed and are negative.  /84   Ht 162.6 cm (64\")   Wt 92.1 kg (203 lb)   LMP  (LMP Unknown)   BMI 34.84 kg/m²       Objective   Physical Exam  Vitals and nursing note reviewed.   Constitutional:       Appearance: Normal appearance. She is well-developed.   Neck:      Thyroid: No thyromegaly.   Pulmonary:      Effort: Pulmonary effort is normal.   Chest:   Breasts:     Right: No mass, nipple discharge, skin change or tenderness.      Left: No mass, nipple discharge, skin change or tenderness.   Abdominal:      General: There is no distension.      Palpations: Abdomen is soft.      Tenderness: There is no abdominal " tenderness.   Genitourinary:     General: Normal vulva.      Exam position: Lithotomy position.      Labia:         Right: No rash or lesion.         Left: No rash or lesion.       Vagina: Normal. No vaginal discharge or bleeding.      Cervix: No friability or lesion.      Uterus: Not enlarged and not tender.       Adnexa:         Right: No mass or tenderness.          Left: No mass or tenderness.     Musculoskeletal:         General: Normal range of motion.      Cervical back: Normal range of motion.   Skin:     General: Skin is warm and dry.      Findings: No rash.   Neurological:      Mental Status: She is alert and oriented to person, place, and time.   Psychiatric:         Mood and Affect: Mood normal.         Behavior: Behavior normal.           Assessment & Plan   Diagnoses and all orders for this visit:    1. Encounter for gynecological examination without abnormal finding (Primary)    2. Screening for malignant neoplasm of the cervix  -     IgP, Aptima HPV    3. History of endometriosis    4. Dysmenorrhea  -     NuSwab VG+ - Swab, Vagina    5. Abnormal uterine bleeding (AUB)    6. History of condyloma acuminatum    7. Breast cancer screening by mammogram  -     Mammo Screening Digital Tomosynthesis Bilateral With CAD; Future    8. Atypical mole  -     Ambulatory Referral to Dermatology      Counseling was given to patient for the following topics: instructions for management, impressions, risks and benefits of treatment options, importance of treatment compliance, and self breast exams  .   Return in about 6 months (around 11/14/2024), or f/u AUB.

## 2024-05-17 LAB
CYTOLOGIST CVX/VAG CYTO: NORMAL
CYTOLOGY CVX/VAG DOC CYTO: NORMAL
CYTOLOGY CVX/VAG DOC THIN PREP: NORMAL
DX ICD CODE: NORMAL
HPV I/H RISK 4 DNA CVX QL PROBE+SIG AMP: NEGATIVE
Lab: NORMAL
OTHER STN SPEC: NORMAL
STAT OF ADQ CVX/VAG CYTO-IMP: NORMAL

## 2024-07-26 ENCOUNTER — APPOINTMENT (OUTPATIENT)
Dept: WOMENS IMAGING | Facility: HOSPITAL | Age: 44
End: 2024-07-26
Payer: COMMERCIAL

## 2024-07-26 PROCEDURE — 77063 BREAST TOMOSYNTHESIS BI: CPT | Performed by: RADIOLOGY

## 2024-07-26 PROCEDURE — 77067 SCR MAMMO BI INCL CAD: CPT | Performed by: RADIOLOGY

## 2024-08-01 DIAGNOSIS — R92.8 ABNORMAL MAMMOGRAM: Primary | ICD-10-CM

## 2024-08-01 DIAGNOSIS — N64.89 BREAST ASYMMETRY: ICD-10-CM

## 2024-08-07 ENCOUNTER — APPOINTMENT (OUTPATIENT)
Dept: WOMENS IMAGING | Facility: HOSPITAL | Age: 44
End: 2024-08-07
Payer: COMMERCIAL

## 2024-08-07 PROCEDURE — 77066 DX MAMMO INCL CAD BI: CPT | Performed by: RADIOLOGY

## 2024-08-07 PROCEDURE — G0279 TOMOSYNTHESIS, MAMMO: HCPCS | Performed by: RADIOLOGY

## 2024-08-07 PROCEDURE — 76642 ULTRASOUND BREAST LIMITED: CPT | Performed by: RADIOLOGY

## 2024-08-07 PROCEDURE — 77062 BREAST TOMOSYNTHESIS BI: CPT | Performed by: RADIOLOGY

## 2024-11-12 ENCOUNTER — OFFICE VISIT (OUTPATIENT)
Dept: OBSTETRICS AND GYNECOLOGY | Age: 44
End: 2024-11-12
Payer: COMMERCIAL

## 2024-11-12 VITALS
HEIGHT: 64 IN | SYSTOLIC BLOOD PRESSURE: 124 MMHG | WEIGHT: 204 LBS | BODY MASS INDEX: 34.83 KG/M2 | DIASTOLIC BLOOD PRESSURE: 82 MMHG

## 2024-11-12 DIAGNOSIS — N93.9 ABNORMAL UTERINE BLEEDING (AUB): ICD-10-CM

## 2024-11-12 DIAGNOSIS — Z30.430 ENCOUNTER FOR IUD INSERTION: Primary | ICD-10-CM

## 2024-11-12 DIAGNOSIS — N94.6 DYSMENORRHEA: ICD-10-CM

## 2024-11-12 DIAGNOSIS — Z13.9 SPECIAL SCREENING: ICD-10-CM

## 2024-11-12 PROBLEM — E89.0 POSTOPERATIVE HYPOTHYROIDISM: Status: ACTIVE | Noted: 2024-08-17

## 2024-11-12 PROBLEM — R92.8 ABNORMAL MAMMOGRAM: Status: RESOLVED | Noted: 2024-08-01 | Resolved: 2024-11-12

## 2024-11-12 PROBLEM — Z86.39 HISTORY OF GRAVES' DISEASE: Status: ACTIVE | Noted: 2024-08-17

## 2024-11-12 LAB
B-HCG UR QL: NEGATIVE
BILIRUB BLD-MCNC: NEGATIVE MG/DL
EXPIRATION DATE: NORMAL
GLUCOSE UR STRIP-MCNC: NEGATIVE MG/DL
INTERNAL NEGATIVE CONTROL: NORMAL
INTERNAL POSITIVE CONTROL: NORMAL
KETONES UR QL: NEGATIVE
LEUKOCYTE EST, POC: NEGATIVE
Lab: NORMAL
NITRITE UR-MCNC: NEGATIVE MG/ML
PH UR: 6 [PH] (ref 5–8)
PROT UR STRIP-MCNC: NEGATIVE MG/DL
RBC # UR STRIP: NEGATIVE /UL
SP GR UR: 1.03 (ref 1–1.03)
UROBILINOGEN UR QL: NORMAL

## 2024-11-12 RX ORDER — METHIMAZOLE 5 MG/1
TABLET ORAL
COMMUNITY

## 2024-11-12 RX ORDER — LOSARTAN POTASSIUM 50 MG/1
50 TABLET ORAL DAILY
COMMUNITY
Start: 2024-09-21

## 2024-11-12 NOTE — PROGRESS NOTES
Procedure   Procedures   IUD Insertion Procedure Note    Pre-operative Diagnosis: AUB/Dysmenorrhea    Post-operative Diagnosis: same    Indications:AUB/Dysmenorrhea    Procedure Details   Urine pregnancy test was done and was NEGATIVE .  The risks (including infection, bleeding, pain, and uterine perforation) and benefits of the procedure were explained to the patient and Written informed consent was obtained.      Cervix cleansed with Betadine. Uterus sounded to 7 cm. IUD inserted without difficulty. String visible and trimmed.    IUD Information:  Mirena, Lot # [id number], Expiration date 2026/OCT.    Condition:  Stable    Complications:  None  Patient tolerated the procedure well without complications.    Plan:    The patient was advised to call for any fever or for prolonged or severe pain or bleeding. She was advised to use NSAID as needed for mild to moderate pain.     Attending Physician Documentation:  I was present for the entire procedure.

## 2024-11-12 NOTE — PROGRESS NOTES
"Subjective   Saskia Fox is a 44 y.o. female presents for mirena insertion today , current contraception Slynd , last pap and vaginal swab negative 5/14/24.        I last saw her in May for annual visit.  Complained of cramps returning even though taking Slynd. She was taking it continuously.  Colonoscopy 5/3/24 benign , having thyroid removal next Tuesday - dr indy Tan.  Noticed the cramping coming back.  Discussed other options including Mirena IUD and endometrial ablation.  Patient would like to get her thyroid taken care of first and then come back to me and discuss options.  Will refer to dermatology for overall mole check and mole on mons.   History of Present Illness    The following portions of the patient's history were reviewed and updated as appropriate: allergies, current medications, past family history, past medical history, past social history, past surgical history, and problem list.    Review of Systems   Constitutional:  Negative for chills, fatigue and fever.   Genitourinary:  Positive for menstrual problem. Negative for vaginal bleeding, vaginal discharge and vaginal pain.   All other systems reviewed and are negative.  /82   Ht 162.6 cm (64\")   Wt 92.5 kg (204 lb)   BMI 35.02 kg/m²       Objective   Physical Exam  Vitals and nursing note reviewed.   Constitutional:       Appearance: Normal appearance.   Pulmonary:      Effort: Pulmonary effort is normal.   Neurological:      Mental Status: She is alert and oriented to person, place, and time.   Psychiatric:         Mood and Affect: Mood normal.         Assessment & Plan   Diagnoses and all orders for this visit:    1. Encounter for IUD insertion (Primary)  -     POC Urinalysis Dipstick  -     Cancel: POC Pregnancy, Urine    2. Special screening  -     POC Pregnancy, Urine  -     POC Urinalysis Dipstick  -     Cancel: POC Pregnancy, Urine    3. Dysmenorrhea    4. Abnormal uterine bleeding (AUB)                  "

## 2024-12-17 ENCOUNTER — OFFICE VISIT (OUTPATIENT)
Dept: OBSTETRICS AND GYNECOLOGY | Age: 44
End: 2024-12-17
Payer: COMMERCIAL

## 2024-12-17 VITALS
SYSTOLIC BLOOD PRESSURE: 130 MMHG | HEIGHT: 64 IN | WEIGHT: 208 LBS | DIASTOLIC BLOOD PRESSURE: 90 MMHG | BODY MASS INDEX: 35.51 KG/M2

## 2024-12-17 DIAGNOSIS — Z97.5 IUD (INTRAUTERINE DEVICE) IN PLACE: ICD-10-CM

## 2024-12-17 DIAGNOSIS — N94.6 DYSMENORRHEA: ICD-10-CM

## 2024-12-17 DIAGNOSIS — N93.9 ABNORMAL UTERINE BLEEDING (AUB): ICD-10-CM

## 2024-12-17 DIAGNOSIS — Z30.431 ENCOUNTER FOR ROUTINE CHECKING OF INTRAUTERINE CONTRACEPTIVE DEVICE (IUD): Primary | ICD-10-CM

## 2024-12-17 RX ORDER — LEVOTHYROXINE SODIUM 125 UG/1
TABLET ORAL
COMMUNITY
Start: 2024-05-27

## 2024-12-17 RX ORDER — LEVONORGESTREL 52 MG/1
1 INTRAUTERINE DEVICE INTRAUTERINE
COMMUNITY

## 2024-12-17 NOTE — PROGRESS NOTES
"Subjective   Saskia Fox is a 44 y.o. female presents for  TVUS and IUD string check -Mirena 11/12/24 , first week after insertion she had some bleeding and cramping , says she still having cramping when wakes up in the mornings and some mild headaches , she says she noticed more  flare ups with  IBS . C/o weight gain since the insertion 4 lbs . She wants to keep the IUD in place for right now hoping she feels better soon.  Ultrasound reveals a normal-sized anteverted uterus with an IUD normally placed and normal ovaries.      History of Present Illness    The following portions of the patient's history were reviewed and updated as appropriate: allergies, current medications, past family history, past medical history, past social history, past surgical history, and problem list.    Review of Systems   Constitutional:  Negative for chills, fatigue and fever.   Gastrointestinal:  Negative for abdominal distention and abdominal pain.   Genitourinary:  Negative for dysuria, vaginal bleeding, vaginal discharge and vaginal pain.   All other systems reviewed and are negative.    /90   Ht 162.6 cm (64\")   Wt 94.3 kg (208 lb)   LMP 11/12/2024 (Approximate)   BMI 35.70 kg/m²     Objective   Physical Exam  Vitals and nursing note reviewed.   Constitutional:       Appearance: Normal appearance.   Pulmonary:      Effort: Pulmonary effort is normal.   Genitourinary:     General: Normal vulva.      Exam position: Lithotomy position.      Labia:         Right: No rash or lesion.         Left: No rash or lesion.       Vagina: Normal. No vaginal discharge or bleeding.      Cervix: Normal. No lesion or erythema.      Comments: IUD strings visualized at os   Neurological:      Mental Status: She is alert and oriented to person, place, and time.   Psychiatric:         Mood and Affect: Mood normal.         Behavior: Behavior normal.         Assessment & Plan   Diagnoses and all orders for this visit:    1. Encounter for " routine checking of intrauterine contraceptive device (IUD) (Primary)    2. Abnormal uterine bleeding (AUB)    3. Dysmenorrhea    4. IUD (intrauterine device) in place         Counseling was given to patient for the following topics: diagnostic results, instructions for management, impressions, and risks and benefits of treatment options . Total time of the encounter was 20 minutes and 15 minutes was spent counseling, exclusive of ultrasound results.    Return in about 8 weeks (around 2/11/2025), or F/U mirena.

## 2025-01-21 ENCOUNTER — TELEPHONE (OUTPATIENT)
Dept: OBSTETRICS AND GYNECOLOGY | Age: 45
End: 2025-01-21
Payer: COMMERCIAL

## 2025-01-21 NOTE — TELEPHONE ENCOUNTER
PT calling in regards to 2/11 appointment.. PT has decided that she wants her IUD taken out. PT is wondering if she can be seen sooner? I told PT I would message and see but Dr. Kelsey is only in [place] on Tuesdays.

## 2025-01-22 NOTE — TELEPHONE ENCOUNTER
Cam , I offered patient the appointment but she prefers something later in the afternoon like 245/300 . There is nothing open at this moment . I told her I will call her if any cancellations . She wants to remove the IUD because she starting to have some abdominal cramping . She is fine to keep the visit on 2/11 .

## 2025-01-23 ENCOUNTER — OFFICE VISIT (OUTPATIENT)
Dept: OBSTETRICS AND GYNECOLOGY | Age: 45
End: 2025-01-23
Payer: COMMERCIAL

## 2025-01-23 VITALS
SYSTOLIC BLOOD PRESSURE: 114 MMHG | HEIGHT: 64 IN | WEIGHT: 212 LBS | DIASTOLIC BLOOD PRESSURE: 72 MMHG | BODY MASS INDEX: 36.19 KG/M2

## 2025-01-23 DIAGNOSIS — N90.7 LABIAL CYST: Primary | ICD-10-CM

## 2025-01-23 DIAGNOSIS — Z30.432 ENCOUNTER FOR IUD REMOVAL: ICD-10-CM

## 2025-01-23 DIAGNOSIS — Z30.011 ENCOUNTER FOR BCP (BIRTH CONTROL PILLS) INITIAL PRESCRIPTION: ICD-10-CM

## 2025-01-23 RX ORDER — DROSPIRENONE 4 MG/1
1 TABLET, FILM COATED ORAL DAILY
Qty: 28 TABLET | Refills: 11 | Status: SHIPPED | OUTPATIENT
Start: 2025-01-23

## 2025-01-23 RX ORDER — SULFAMETHOXAZOLE AND TRIMETHOPRIM 800; 160 MG/1; MG/1
1 TABLET ORAL 2 TIMES DAILY
Qty: 10 TABLET | Refills: 0 | Status: SHIPPED | OUTPATIENT
Start: 2025-01-23 | End: 2025-01-28

## 2025-01-23 NOTE — PROGRESS NOTES
"Subjective     History of Present Illness  Saskia Fox is a 45 y.o.  female is being seen today for   Chief Complaint   Patient presents with    Gynecologic Exam     Gyn f/u mirena removal- c/o severe cramps & discharge, pt requesting to go back to slynd     Patient here today requesting Mirena IUD removal.  Mirena IUD was inserted on 2024 by Dr. Kelsey.  Patient reports she has been experiencing severe cramps, would like IUD removed and to go back on Slynd.  Currently on menses.  Also c/o L labial tenderness x 3 days.  Unsure if this is from an infected hair follicle or wearing pads.  No fevers.  No discharge or bleeding from her labia.      The following portions of the patient's history were reviewed and updated as appropriate: allergies, current medications, past family history, past medical history, past social history, past surgical history and problem list.    /72   Ht 162.6 cm (64\")   Wt 96.2 kg (212 lb)   BMI 36.39 kg/m²         Review of Systems   Constitutional: Negative.    HENT: Negative.     Eyes: Negative.    Respiratory: Negative.     Cardiovascular: Negative.    Gastrointestinal: Negative.    Endocrine: Negative.    Genitourinary:  Positive for pelvic pain (cramping with IUD) and vaginal pain (L labia tenderness).   Musculoskeletal: Negative.    Skin: Negative.    Allergic/Immunologic: Negative.    Neurological: Negative.    Hematological: Negative.    Psychiatric/Behavioral: Negative.         Objective   Physical Exam  Constitutional:       General: She is not in acute distress.  Cardiovascular:      Rate and Rhythm: Normal rate and regular rhythm.      Pulses: Normal pulses.      Heart sounds: Normal heart sounds.   Pulmonary:      Effort: Pulmonary effort is normal.      Breath sounds: Normal breath sounds.   Genitourinary:     Exam position: Lithotomy position.      Labia:         Right: No rash, tenderness or lesion.         Left: Tenderness present. No rash or lesion.     "   Vagina: Normal.      Cervix: Normal.          Comments: Left labia with cyst present. Entire left lower labia is erythematous, swollen, tender to the touch. Labial cyst is hard, about 3 cm in size.   IUD strings visible on exam. IUD removed without difficulty, see procedure note below.   Neurological:      Mental Status: She is alert and oriented to person, place, and time.   Psychiatric:         Mood and Affect: Mood normal.         Behavior: Behavior normal.         Thought Content: Thought content normal.         Judgment: Judgment normal.           Assessment & Plan   Diagnoses and all orders for this visit:    1. Labial cyst (Primary)  -     sulfamethoxazole-trimethoprim (Bactrim DS) 800-160 MG per tablet; Take 1 tablet by mouth 2 (Two) Times a Day for 5 days.  Dispense: 10 tablet; Refill: 0    2. Encounter for IUD removal    3. Encounter for BCP (birth control pills) initial prescription  -     Drospirenone (Slynd) 4 MG tablet; Take 1 tablet by mouth Daily.  Dispense: 28 tablet; Refill: 11    -Reviewed physical exam findings of left labial cyst with patient.  Advised starting antibiotics, Bactrim sent to pharmacy.  Begin warm compresses and warm baths to decrease swelling.  Advise returning for follow-up with Dr. Kelsye at soonest available appointment to reevaluate.  Discussed with patient that the cyst fluid appears hard and that an incision and drainage would not give relief.  Call if she gets any fevers or symptoms worsen.  -IUD removed without difficulty, see procedure note below.  -Slynd samples given to patient.  Slynd prescription sent to pharmacy.    All questions answered. Patient verbalizes understanding and is agreeable to plan.  Return for soonest available with Dr. Kelsey - labial cyst.       IUD Removal    Date of procedure:  1/23/2025    Risks and benefits discussed? yes  All questions answered? yes  Written consents given by The patient  Reason for removal: Side effect: cramping  IUD:   Mirena  Time out was performed prior to procedure.       Procedure documentation:    A speculum was placed in order to view the cervix.  The IUD string was easily seen.  The string was grasped and the IUD was removed without difficulty.  The IUD did not appear to be adherent to the uterine cavity. It was removed intact.    She tolerated the procedure without any difficulty.     Post procedure instructions: Patient notified to call with heavy bleeding, fever or increasing pain.    Contraception:  Discussed, wants to go back on slynd. Samples given and prescription sent to pharmacy.     Follow up needed: Soonest available with Dr. Kelsey to evaluate labial cyst.

## 2025-02-04 ENCOUNTER — OFFICE VISIT (OUTPATIENT)
Dept: OBSTETRICS AND GYNECOLOGY | Age: 45
End: 2025-02-04
Payer: COMMERCIAL

## 2025-02-04 VITALS
HEIGHT: 64 IN | DIASTOLIC BLOOD PRESSURE: 68 MMHG | WEIGHT: 210 LBS | BODY MASS INDEX: 35.85 KG/M2 | SYSTOLIC BLOOD PRESSURE: 108 MMHG

## 2025-02-04 DIAGNOSIS — L73.9 FOLLICULITIS OF PERINEUM: Primary | ICD-10-CM

## 2025-02-04 PROBLEM — Z97.5 IUD (INTRAUTERINE DEVICE) IN PLACE: Status: RESOLVED | Noted: 2024-12-17 | Resolved: 2025-02-04

## 2025-02-04 RX ORDER — LIOTHYRONINE SODIUM 5 UG/1
5 TABLET ORAL DAILY
COMMUNITY
Start: 2025-01-27

## 2025-02-04 RX ORDER — ACETAMINOPHEN 160 MG
TABLET,DISINTEGRATING ORAL
COMMUNITY

## 2025-02-04 RX ORDER — LEVOTHYROXINE SODIUM 125 UG/1
125 TABLET ORAL DAILY
COMMUNITY
Start: 2025-01-27

## 2025-02-04 NOTE — PROGRESS NOTES
"Subjective   Saskia Fox is a 45 y.o. female presents with c/o recent left labial cyst for follow-up. Reports cyst almost gone has no problems today, wants you to examine the area where the cyst was  , contraception slynd - normal period since IUD removal ,no longer having bleeding . Slynd is controlling it .  Discussed vulvar hygiene and minimizing shaving in this area.  Also discussed management including warm compresses with warm soaks and no manipulation of the area with an infected hair follicle present.    Gynecologic Exam  The patient's pertinent negatives include no vaginal discharge. Pertinent negatives include no abdominal pain, chills or fever.       The following portions of the patient's history were reviewed and updated as appropriate: allergies, current medications, past family history, past medical history, past social history, past surgical history, and problem list.    Review of Systems   Constitutional:  Negative for chills, fatigue and fever.   Gastrointestinal:  Negative for abdominal pain.   Genitourinary:  Negative for vaginal bleeding, vaginal discharge and vaginal pain.   All other systems reviewed and are negative.  /68   Ht 162.6 cm (64\")   Wt 95.3 kg (210 lb)   LMP 01/28/2025 (Approximate)   BMI 36.05 kg/m²       Objective   Physical Exam  Vitals and nursing note reviewed.   Constitutional:       Appearance: Normal appearance.   Genitourinary:     General: Normal vulva.      Labia:         Right: No tenderness or lesion.         Left: Lesion present. No tenderness or injury.           Comments: 1 cm dark brown mole on left mons  1 cm keratinized skin on left labia  Healing infected hair follicle on left labia  Neurological:      Mental Status: She is alert and oriented to person, place, and time.   Psychiatric:         Mood and Affect: Mood normal.         Behavior: Behavior normal.         Assessment & Plan   Diagnoses and all orders for this visit:    1. Folliculitis of " perineum (Primary)       Counseling was given to patient for the following topics: instructions for management, prognosis, impressions, risks and benefits of treatment options, and importance of treatment compliance . Total time of the encounter was 20 minutes and 15 minutes was spent counseling.

## 2025-05-20 ENCOUNTER — OFFICE VISIT (OUTPATIENT)
Dept: OBSTETRICS AND GYNECOLOGY | Age: 45
End: 2025-05-20
Payer: COMMERCIAL

## 2025-05-20 VITALS
DIASTOLIC BLOOD PRESSURE: 72 MMHG | HEIGHT: 64 IN | SYSTOLIC BLOOD PRESSURE: 118 MMHG | WEIGHT: 213 LBS | BODY MASS INDEX: 36.37 KG/M2

## 2025-05-20 DIAGNOSIS — Z12.31 BREAST CANCER SCREENING BY MAMMOGRAM: ICD-10-CM

## 2025-05-20 DIAGNOSIS — Z11.51 SPECIAL SCREENING EXAMINATION FOR HUMAN PAPILLOMAVIRUS (HPV): ICD-10-CM

## 2025-05-20 DIAGNOSIS — N94.6 DYSMENORRHEA: ICD-10-CM

## 2025-05-20 DIAGNOSIS — N93.9 ABNORMAL UTERINE BLEEDING (AUB): ICD-10-CM

## 2025-05-20 DIAGNOSIS — L73.2 HYDRADENITIS: ICD-10-CM

## 2025-05-20 DIAGNOSIS — Z01.419 ENCOUNTER FOR GYNECOLOGICAL EXAMINATION WITHOUT ABNORMAL FINDING: Primary | ICD-10-CM

## 2025-05-20 DIAGNOSIS — N95.1 PERIMENOPAUSAL SYMPTOMS: ICD-10-CM

## 2025-05-20 DIAGNOSIS — Z87.42 HISTORY OF ENDOMETRIOSIS: ICD-10-CM

## 2025-05-20 DIAGNOSIS — Z12.4 SCREENING FOR MALIGNANT NEOPLASM OF THE CERVIX: ICD-10-CM

## 2025-05-20 RX ORDER — FAMOTIDINE 40 MG/1
40 TABLET, FILM COATED ORAL
COMMUNITY
Start: 2025-04-23

## 2025-05-20 RX ORDER — SULFAMETHOXAZOLE AND TRIMETHOPRIM 800; 160 MG/1; MG/1
1 TABLET ORAL 2 TIMES DAILY
Qty: 14 TABLET | Refills: 0 | Status: SHIPPED | OUTPATIENT
Start: 2025-05-20 | End: 2025-05-27

## 2025-05-20 RX ORDER — LEVOTHYROXINE SODIUM 137 UG/1
137 TABLET ORAL DAILY
COMMUNITY
Start: 2025-03-31

## 2025-05-20 NOTE — PROGRESS NOTES
"Subjective   Saskia Fox is a 45 y.o. female presents for  annual visit today , last pap 5/14/24 neg , last pap 5/14/24 neg , mammogram with bilateral breast U/S 8/7/24 benign , colonoscopy 5/3/24 , contraception slynd no periods , decreased cramping on the pill . C/o weight gain she is eating healthy and dieting nothing is helping , feeling always hot .   Discussed loss of estrogen at this age with patient as likely cause of symptoms.  Discussed option of adding estrogen replacement to Slynd since she was doing well on the Slynd with no periods.  Advised patient to read the book, the new menopause and let me know if she would like to do.    Gynecologic Exam  The patient's pertinent negatives include no pelvic pain or vaginal discharge. Pertinent negatives include no abdominal pain, chills, dysuria or fever.       The following portions of the patient's history were reviewed and updated as appropriate: allergies, current medications, past family history, past medical history, past social history, past surgical history, and problem list.    Review of Systems   Constitutional:  Positive for unexpected weight change. Negative for chills, fatigue and fever.   Gastrointestinal:  Negative for abdominal pain.   Endocrine: Positive for heat intolerance.   Genitourinary:  Negative for dysuria, menstrual problem, pelvic pain, vaginal bleeding, vaginal discharge and vaginal pain.   All other systems reviewed and are negative.  /72   Ht 162.6 cm (64\")   Wt 96.6 kg (213 lb)   LMP  (LMP Unknown)   BMI 36.56 kg/m²       Objective   Physical Exam  Vitals and nursing note reviewed.   Constitutional:       Appearance: Normal appearance. She is well-developed.   Neck:      Thyroid: No thyromegaly.   Pulmonary:      Effort: Pulmonary effort is normal.   Chest:   Breasts:     Right: No mass, nipple discharge, skin change or tenderness.      Left: No mass, nipple discharge, skin change or tenderness.   Abdominal:      " General: There is no distension.      Palpations: Abdomen is soft.      Tenderness: There is no abdominal tenderness.   Genitourinary:     General: Normal vulva.      Exam position: Lithotomy position.      Labia:         Right: No rash or lesion.         Left: No rash or lesion.       Vagina: Normal. No vaginal discharge or bleeding.      Cervix: No friability or lesion.      Uterus: Not enlarged and not tender.       Adnexa:         Right: No mass or tenderness.          Left: No mass or tenderness.     Musculoskeletal:         General: Normal range of motion.      Cervical back: Normal range of motion.   Skin:     General: Skin is warm and dry.      Findings: No rash.   Neurological:      Mental Status: She is alert and oriented to person, place, and time.   Psychiatric:         Mood and Affect: Mood normal.         Behavior: Behavior normal.           Assessment & Plan   Diagnoses and all orders for this visit:    1. Encounter for gynecological examination without abnormal finding (Primary)    2. Abnormal uterine bleeding (AUB)    3. Dysmenorrhea    4. History of endometriosis    5. Perimenopausal symptoms    6. Breast cancer screening by mammogram  -     Mammo Screening Digital Tomosynthesis Bilateral With CAD; Future    7. Hydradenitis      Counseling was given to patient for the following topics: instructions for management, impressions, risks and benefits of treatment options, importance of treatment compliance, and self breast exams  .

## 2025-05-22 LAB
CYTOLOGIST CVX/VAG CYTO: NORMAL
CYTOLOGY CVX/VAG DOC CYTO: NORMAL
CYTOLOGY CVX/VAG DOC THIN PREP: NORMAL
DX ICD CODE: NORMAL
HPV I/H RISK 4 DNA CVX QL PROBE+SIG AMP: NEGATIVE
OTHER STN SPEC: NORMAL
SERVICE CMNT-IMP: NORMAL
STAT OF ADQ CVX/VAG CYTO-IMP: NORMAL

## 2025-05-27 ENCOUNTER — TELEPHONE (OUTPATIENT)
Dept: OBSTETRICS AND GYNECOLOGY | Age: 45
End: 2025-05-27
Payer: COMMERCIAL

## 2025-05-27 DIAGNOSIS — N95.1 PERIMENOPAUSAL SYMPTOMS: Primary | ICD-10-CM

## 2025-05-27 RX ORDER — ESTRADIOL 0.1 MG/D
1 FILM, EXTENDED RELEASE TRANSDERMAL 2 TIMES WEEKLY
Qty: 24 EACH | Refills: 3 | Status: SHIPPED | OUTPATIENT
Start: 2025-05-29

## 2025-05-27 NOTE — TELEPHONE ENCOUNTER
Please let patient know that I have sent an estradiol patch to her pharmacy to use twice weekly.  Please make an appointment with me in 6 to 8 weeks to follow-up on how she is doing with this.  Or if patient prefers she can let me know through [employer] in 6 to 8 weeks.

## 2025-08-08 ENCOUNTER — APPOINTMENT (OUTPATIENT)
Dept: WOMENS IMAGING | Facility: HOSPITAL | Age: 45
End: 2025-08-08
Payer: COMMERCIAL

## 2025-08-08 PROCEDURE — 77063 BREAST TOMOSYNTHESIS BI: CPT | Performed by: RADIOLOGY

## 2025-08-08 PROCEDURE — 77067 SCR MAMMO BI INCL CAD: CPT | Performed by: RADIOLOGY
